# Patient Record
Sex: MALE | Race: WHITE | NOT HISPANIC OR LATINO | Employment: FULL TIME | URBAN - METROPOLITAN AREA
[De-identification: names, ages, dates, MRNs, and addresses within clinical notes are randomized per-mention and may not be internally consistent; named-entity substitution may affect disease eponyms.]

---

## 2017-12-05 ENCOUNTER — TELEPHONE (OUTPATIENT)
Dept: HEMATOLOGY ONCOLOGY | Facility: MEDICAL CENTER | Age: 42
End: 2017-12-05

## 2017-12-05 NOTE — TELEPHONE ENCOUNTER
1st attempt to contact the patient- Left voicemail for patient requesting a return call to schedule New Hematology appointment. Ref: Dr. Valero Dx: pulmonary embolism

## 2017-12-19 ENCOUNTER — OFFICE VISIT (OUTPATIENT)
Dept: HEMATOLOGY ONCOLOGY | Facility: MEDICAL CENTER | Age: 42
End: 2017-12-19

## 2017-12-19 VITALS
RESPIRATION RATE: 16 BRPM | OXYGEN SATURATION: 98 % | TEMPERATURE: 98 F | HEIGHT: 68 IN | HEART RATE: 88 BPM | BODY MASS INDEX: 28.63 KG/M2 | SYSTOLIC BLOOD PRESSURE: 128 MMHG | WEIGHT: 188.93 LBS | DIASTOLIC BLOOD PRESSURE: 74 MMHG

## 2017-12-19 DIAGNOSIS — I26.99 PULMONARY EMBOLUS, LEFT (HCC): ICD-10-CM

## 2017-12-19 PROCEDURE — 99204 OFFICE O/P NEW MOD 45 MIN: CPT | Performed by: INTERNAL MEDICINE

## 2017-12-19 RX ORDER — ASPIRIN 81 MG/1
81 TABLET, CHEWABLE ORAL DAILY
COMMUNITY

## 2017-12-19 ASSESSMENT — PAIN SCALES - GENERAL: PAINLEVEL: 5=MODERATE PAIN

## 2017-12-19 NOTE — PROGRESS NOTES
Consult Note: Hematology    Date of consultation: 12/19/2017     Referring provider: No ref. provider found    Reason for consultation:   CC: Pulmonary embolus    History of presenting illness:   Valeriano Cantu  is a 42 y.o. year old male first seen in clinic on 12/19/2017. He is a very pleasant male who was in his usual state of health until early in November. He developed severe chest pain, shortness of breath which prompted him to go to the hospital on November 12, 2017. Patient had a CT scan of the chest which revealed pulmonary embolism. The pulmonary embolism is unprovoked and the patient was started on Xeralto and discharged home with follow-up with hematology.    Pulmonary embolus  Location, left lung  Severity, small  Modifying factors, tobacco use  Quality, pulmonary artery  Duration, diagnosed in November 2017  Context, discovered on CT scan of the chest done for chest pain  Associated factors, chest pain and shortness of breath    Past Medical History:  Pulmonary embolus    Allergies:  Patient has no known allergies.    Medications:    Current Outpatient Prescriptions   Medication Sig Dispense Refill   • rivaroxaban (XARELTO) 20 MG Tab tablet Take 20 mg by mouth with dinner.     • aspirin (ASA) 81 MG Chew Tab chewable tablet Take 81 mg by mouth every day.     • trazodone (DESYREL) 100 MG TABS Take 100 mg by mouth as needed.       No current facility-administered medications for this visit.        Social History:     Social History     Social History   • Marital status: Single     Spouse name: N/A   • Number of children: N/A   • Years of education: N/A     Occupational History   • Not on file.     Social History Main Topics   • Smoking status: Not on file   • Smokeless tobacco: Not on file   • Alcohol use Not on file   • Drug use: Unknown   • Sexual activity: Not on file     Other Topics Concern   • Not on file     Social History Narrative   • No narrative on file       Family History:     Family History  "  Problem Relation Age of Onset   • Heart Attack Mother    • Cancer Father      h and n       Review of Systems:  Constitutional: No fever, chills, weight loss ,malaise/fatigue.      All other review of systems are negative except what was mentioned above in the HPI.    Physical Exam:  Vitals:   /74   Pulse 88   Temp 36.7 °C (98 °F)   Resp 16   Ht 1.727 m (5' 8\")   Wt 85.7 kg (188 lb 15 oz)   SpO2 98%   BMI 28.73 kg/m²     General: Not in acute distress, alert and oriented x 3  HEENT: No pallor, icterus. Oropharynx clear.   Neck: Supple, no palpable masses.  Lymph nodes: No palpable cervical, supraclavicular, axillary or inguinal lymphadenopathy.    CVS: regular rate and rhythm, no rubs or gallops  RESP: Clear to auscultate bilaterally, no wheezing or crackles.   ABD: Soft, non tender, non distended, positive bowel sounds, no palpable organomegaly  EXT: No edema or cyanosis  CNS: Alert and oriented x3, No focal deficits.  Skin- No rash      Labs:     Imaging        Assessment and Plan:  -VTE  -Patient had a unprovoked symptomatic pulmonary embolus and will need lifelong anticoagulation.  -Discussed the pros and cons of checking for an inherited thrombophilia. There is also which would not . The patient will like to defer workup if it is not affecting the treatment.  -Patient states the Xeralto is too expensive, I suggested Coumadin may be more affordable. The patient will discuss this with his primary care physician.    -Tobacco abuse  -The patient is a 20 year x 1 pack a day smoker.  -discussed its role in VTE.  -Also discussed the patient has early signs of COPD on his CT scan of the chest.  -The patient is not interested in quitting at this time.  -Less than 3 minutes spent in counseling regarding tobacco cessation.                             He agreed and verbalized his agreement and understanding with the current plan.  I answered all questions and concerns he has at this " time.    All labs and/or imaging studies mentioned in the note above were reviewed with and explained to the patient as a pertain to medical decision making.    Please note that this dictation was created using voice recognition software. I have made every reasonable attempt to correct obvious errors, but I expect that there are errors of grammar and possibly content that I did not discover before finalizing the note.      SIGNATURES:  Veronika Avalos    CC:  Pcp Pt States None  No ref. provider found

## 2018-02-21 ENCOUNTER — TELEPHONE (OUTPATIENT)
Dept: HEMATOLOGY ONCOLOGY | Facility: MEDICAL CENTER | Age: 43
End: 2018-02-21

## 2019-11-13 ENCOUNTER — HOSPITAL ENCOUNTER (INPATIENT)
Facility: MEDICAL CENTER | Age: 44
LOS: 4 days | DRG: 194 | End: 2019-11-17
Attending: EMERGENCY MEDICINE | Admitting: INTERNAL MEDICINE
Payer: MEDICAID

## 2019-11-13 ENCOUNTER — HOSPITAL ENCOUNTER (OUTPATIENT)
Dept: RADIOLOGY | Facility: MEDICAL CENTER | Age: 44
End: 2019-11-13

## 2019-11-13 ENCOUNTER — APPOINTMENT (OUTPATIENT)
Dept: RADIOLOGY | Facility: MEDICAL CENTER | Age: 44
DRG: 194 | End: 2019-11-13
Attending: EMERGENCY MEDICINE
Payer: MEDICAID

## 2019-11-13 PROBLEM — E87.6 HYPOKALEMIA: Status: ACTIVE | Noted: 2019-11-13

## 2019-11-13 PROBLEM — R74.8 ELEVATED LIVER ENZYMES: Status: ACTIVE | Noted: 2019-11-13

## 2019-11-13 PROBLEM — Z72.0 NICOTINE ABUSE: Status: ACTIVE | Noted: 2019-11-13

## 2019-11-13 PROBLEM — J18.9 COMMUNITY ACQUIRED PNEUMONIA: Status: ACTIVE | Noted: 2019-11-13

## 2019-11-13 PROBLEM — F10.20 ALCOHOL DEPENDENCE (HCC): Status: ACTIVE | Noted: 2019-11-13

## 2019-11-13 PROBLEM — D61.818 PANCYTOPENIA (HCC): Status: ACTIVE | Noted: 2019-11-13

## 2019-11-13 LAB
ALBUMIN SERPL BCP-MCNC: 2.5 G/DL (ref 3.2–4.9)
ALP SERPL-CCNC: 228 U/L (ref 30–99)
ALT SERPL-CCNC: 95 U/L (ref 2–50)
ANION GAP SERPL CALC-SCNC: 9 MMOL/L (ref 0–11.9)
ANISOCYTOSIS BLD QL SMEAR: ABNORMAL
AST SERPL-CCNC: 165 U/L (ref 12–45)
BASOPHILS # BLD AUTO: 0 % (ref 0–1.8)
BASOPHILS # BLD: 0 K/UL (ref 0–0.12)
BILIRUB CONJ SERPL-MCNC: 0.9 MG/DL (ref 0.1–0.5)
BILIRUB INDIRECT SERPL-MCNC: 0.5 MG/DL (ref 0–1)
BILIRUB SERPL-MCNC: 1.4 MG/DL (ref 0.1–1.5)
BUN SERPL-MCNC: 5 MG/DL (ref 8–22)
CALCIUM SERPL-MCNC: 7.5 MG/DL (ref 8.5–10.5)
CHLORIDE SERPL-SCNC: 102 MMOL/L (ref 96–112)
CO2 SERPL-SCNC: 24 MMOL/L (ref 20–33)
CREAT SERPL-MCNC: 0.46 MG/DL (ref 0.5–1.4)
EKG IMPRESSION: NORMAL
EOSINOPHIL # BLD AUTO: 0 K/UL (ref 0–0.51)
EOSINOPHIL NFR BLD: 0 % (ref 0–6.9)
ERYTHROCYTE [DISTWIDTH] IN BLOOD BY AUTOMATED COUNT: 46.2 FL (ref 35.9–50)
FLUAV RNA SPEC QL NAA+PROBE: NEGATIVE
FLUBV RNA SPEC QL NAA+PROBE: NEGATIVE
GLUCOSE SERPL-MCNC: 117 MG/DL (ref 65–99)
HCT VFR BLD AUTO: 35.3 % (ref 42–52)
HGB BLD-MCNC: 12.7 G/DL (ref 14–18)
LACTATE BLD-SCNC: 1.8 MMOL/L (ref 0.5–2)
LYMPHOCYTES # BLD AUTO: 1.87 K/UL (ref 1–4.8)
LYMPHOCYTES NFR BLD: 42.5 % (ref 22–41)
MANUAL DIFF BLD: NORMAL
MCH RBC QN AUTO: 31.1 PG (ref 27–33)
MCHC RBC AUTO-ENTMCNC: 36 G/DL (ref 33.7–35.3)
MCV RBC AUTO: 86.3 FL (ref 81.4–97.8)
MICROCYTES BLD QL SMEAR: ABNORMAL
MONOCYTES # BLD AUTO: 0.15 K/UL (ref 0–0.85)
MONOCYTES NFR BLD AUTO: 3.5 % (ref 0–13.4)
MORPHOLOGY BLD-IMP: NORMAL
NEUTROPHILS # BLD AUTO: 2.38 K/UL (ref 1.82–7.42)
NEUTROPHILS NFR BLD: 54 % (ref 44–72)
NRBC # BLD AUTO: 0 K/UL
NRBC BLD-RTO: 0 /100 WBC
NT-PROBNP SERPL IA-MCNC: 212 PG/ML (ref 0–125)
PLATELET # BLD AUTO: 78 K/UL (ref 164–446)
PLATELET BLD QL SMEAR: NORMAL
PMV BLD AUTO: 12.8 FL (ref 9–12.9)
POIKILOCYTOSIS BLD QL SMEAR: NORMAL
POLYCHROMASIA BLD QL SMEAR: NORMAL
POTASSIUM SERPL-SCNC: 3.1 MMOL/L (ref 3.6–5.5)
PROCALCITONIN SERPL-MCNC: 0.33 NG/ML
PROT SERPL-MCNC: 6.9 G/DL (ref 6–8.2)
RBC # BLD AUTO: 4.09 M/UL (ref 4.7–6.1)
RBC BLD AUTO: PRESENT
SMUDGE CELLS BLD QL SMEAR: NORMAL
SODIUM SERPL-SCNC: 135 MMOL/L (ref 135–145)
TARGETS BLD QL SMEAR: NORMAL
TROPONIN T SERPL-MCNC: 6 NG/L (ref 6–19)
WBC # BLD AUTO: 4.4 K/UL (ref 4.8–10.8)

## 2019-11-13 PROCEDURE — HZ2ZZZZ DETOXIFICATION SERVICES FOR SUBSTANCE ABUSE TREATMENT: ICD-10-PCS | Performed by: INTERNAL MEDICINE

## 2019-11-13 PROCEDURE — 770020 HCHG ROOM/CARE - TELE (206)

## 2019-11-13 PROCEDURE — 84484 ASSAY OF TROPONIN QUANT: CPT

## 2019-11-13 PROCEDURE — 93005 ELECTROCARDIOGRAM TRACING: CPT | Performed by: EMERGENCY MEDICINE

## 2019-11-13 PROCEDURE — 71275 CT ANGIOGRAPHY CHEST: CPT

## 2019-11-13 PROCEDURE — 85007 BL SMEAR W/DIFF WBC COUNT: CPT

## 2019-11-13 PROCEDURE — 87502 INFLUENZA DNA AMP PROBE: CPT

## 2019-11-13 PROCEDURE — 99407 BEHAV CHNG SMOKING > 10 MIN: CPT | Performed by: INTERNAL MEDICINE

## 2019-11-13 PROCEDURE — 80048 BASIC METABOLIC PNL TOTAL CA: CPT

## 2019-11-13 PROCEDURE — 36415 COLL VENOUS BLD VENIPUNCTURE: CPT

## 2019-11-13 PROCEDURE — 87040 BLOOD CULTURE FOR BACTERIA: CPT

## 2019-11-13 PROCEDURE — 96365 THER/PROPH/DIAG IV INF INIT: CPT

## 2019-11-13 PROCEDURE — 80076 HEPATIC FUNCTION PANEL: CPT

## 2019-11-13 PROCEDURE — 700102 HCHG RX REV CODE 250 W/ 637 OVERRIDE(OP): Performed by: EMERGENCY MEDICINE

## 2019-11-13 PROCEDURE — 83880 ASSAY OF NATRIURETIC PEPTIDE: CPT

## 2019-11-13 PROCEDURE — 84145 PROCALCITONIN (PCT): CPT

## 2019-11-13 PROCEDURE — A9270 NON-COVERED ITEM OR SERVICE: HCPCS | Performed by: EMERGENCY MEDICINE

## 2019-11-13 PROCEDURE — 99285 EMERGENCY DEPT VISIT HI MDM: CPT

## 2019-11-13 PROCEDURE — 85027 COMPLETE CBC AUTOMATED: CPT

## 2019-11-13 PROCEDURE — 700117 HCHG RX CONTRAST REV CODE 255: Performed by: EMERGENCY MEDICINE

## 2019-11-13 PROCEDURE — 83605 ASSAY OF LACTIC ACID: CPT

## 2019-11-13 PROCEDURE — 99223 1ST HOSP IP/OBS HIGH 75: CPT | Performed by: INTERNAL MEDICINE

## 2019-11-13 PROCEDURE — 700105 HCHG RX REV CODE 258: Performed by: EMERGENCY MEDICINE

## 2019-11-13 PROCEDURE — 700111 HCHG RX REV CODE 636 W/ 250 OVERRIDE (IP): Performed by: EMERGENCY MEDICINE

## 2019-11-13 PROCEDURE — 700105 HCHG RX REV CODE 258: Performed by: INTERNAL MEDICINE

## 2019-11-13 RX ORDER — PREDNISONE 20 MG/1
40 TABLET ORAL DAILY
COMMUNITY
Start: 2019-11-05

## 2019-11-13 RX ORDER — LORAZEPAM 0.5 MG/1
0.5 TABLET ORAL EVERY 4 HOURS PRN
Status: DISCONTINUED | OUTPATIENT
Start: 2019-11-13 | End: 2019-11-17 | Stop reason: HOSPADM

## 2019-11-13 RX ORDER — AMOXICILLIN 250 MG
2 CAPSULE ORAL 2 TIMES DAILY
Status: DISCONTINUED | OUTPATIENT
Start: 2019-11-14 | End: 2019-11-14

## 2019-11-13 RX ORDER — TRAZODONE HYDROCHLORIDE 50 MG/1
100 TABLET ORAL
Status: DISCONTINUED | OUTPATIENT
Start: 2019-11-13 | End: 2019-11-17 | Stop reason: HOSPADM

## 2019-11-13 RX ORDER — FOLIC ACID 1 MG/1
1 TABLET ORAL DAILY
Status: DISCONTINUED | OUTPATIENT
Start: 2019-11-14 | End: 2019-11-14

## 2019-11-13 RX ORDER — POLYETHYLENE GLYCOL 3350 17 G/17G
1 POWDER, FOR SOLUTION ORAL
Status: DISCONTINUED | OUTPATIENT
Start: 2019-11-13 | End: 2019-11-14

## 2019-11-13 RX ORDER — LORAZEPAM 2 MG/ML
0.5 INJECTION INTRAMUSCULAR EVERY 4 HOURS PRN
Status: DISCONTINUED | OUTPATIENT
Start: 2019-11-13 | End: 2019-11-17 | Stop reason: HOSPADM

## 2019-11-13 RX ORDER — LORAZEPAM 2 MG/ML
2 INJECTION INTRAMUSCULAR
Status: DISCONTINUED | OUTPATIENT
Start: 2019-11-13 | End: 2019-11-17 | Stop reason: HOSPADM

## 2019-11-13 RX ORDER — LORAZEPAM 2 MG/1
2 TABLET ORAL
Status: DISCONTINUED | OUTPATIENT
Start: 2019-11-13 | End: 2019-11-17 | Stop reason: HOSPADM

## 2019-11-13 RX ORDER — AZITHROMYCIN 250 MG/1
500 TABLET, FILM COATED ORAL DAILY
Status: COMPLETED | OUTPATIENT
Start: 2019-11-14 | End: 2019-11-16

## 2019-11-13 RX ORDER — LORAZEPAM 2 MG/ML
1.5 INJECTION INTRAMUSCULAR
Status: DISCONTINUED | OUTPATIENT
Start: 2019-11-13 | End: 2019-11-17 | Stop reason: HOSPADM

## 2019-11-13 RX ORDER — LORAZEPAM 1 MG/1
1 TABLET ORAL EVERY 4 HOURS PRN
Status: DISCONTINUED | OUTPATIENT
Start: 2019-11-13 | End: 2019-11-17 | Stop reason: HOSPADM

## 2019-11-13 RX ORDER — BENZONATATE 100 MG/1
200 CAPSULE ORAL ONCE
Status: COMPLETED | OUTPATIENT
Start: 2019-11-13 | End: 2019-11-13

## 2019-11-13 RX ORDER — LORAZEPAM 2 MG/1
4 TABLET ORAL
Status: DISCONTINUED | OUTPATIENT
Start: 2019-11-13 | End: 2019-11-17 | Stop reason: HOSPADM

## 2019-11-13 RX ORDER — ONDANSETRON 2 MG/ML
4 INJECTION INTRAMUSCULAR; INTRAVENOUS EVERY 4 HOURS PRN
Status: DISCONTINUED | OUTPATIENT
Start: 2019-11-13 | End: 2019-11-17 | Stop reason: HOSPADM

## 2019-11-13 RX ORDER — LORAZEPAM 2 MG/ML
1 INJECTION INTRAMUSCULAR
Status: DISCONTINUED | OUTPATIENT
Start: 2019-11-13 | End: 2019-11-17 | Stop reason: HOSPADM

## 2019-11-13 RX ORDER — THIAMINE MONONITRATE (VIT B1) 100 MG
100 TABLET ORAL DAILY
Status: DISCONTINUED | OUTPATIENT
Start: 2019-11-14 | End: 2019-11-14

## 2019-11-13 RX ORDER — BISACODYL 10 MG
10 SUPPOSITORY, RECTAL RECTAL
Status: DISCONTINUED | OUTPATIENT
Start: 2019-11-13 | End: 2019-11-14

## 2019-11-13 RX ORDER — SODIUM CHLORIDE 9 MG/ML
INJECTION, SOLUTION INTRAVENOUS CONTINUOUS
Status: DISCONTINUED | OUTPATIENT
Start: 2019-11-13 | End: 2019-11-15

## 2019-11-13 RX ORDER — ASPIRIN 81 MG/1
81 TABLET, CHEWABLE ORAL DAILY
Status: DISCONTINUED | OUTPATIENT
Start: 2019-11-14 | End: 2019-11-17 | Stop reason: HOSPADM

## 2019-11-13 RX ORDER — ONDANSETRON 4 MG/1
4 TABLET, ORALLY DISINTEGRATING ORAL EVERY 4 HOURS PRN
Status: DISCONTINUED | OUTPATIENT
Start: 2019-11-13 | End: 2019-11-17 | Stop reason: HOSPADM

## 2019-11-13 RX ORDER — PROMETHAZINE HYDROCHLORIDE 25 MG/1
12.5-25 TABLET ORAL EVERY 4 HOURS PRN
Status: DISCONTINUED | OUTPATIENT
Start: 2019-11-13 | End: 2019-11-14

## 2019-11-13 RX ORDER — PROMETHAZINE HYDROCHLORIDE 25 MG/1
12.5-25 SUPPOSITORY RECTAL EVERY 4 HOURS PRN
Status: DISCONTINUED | OUTPATIENT
Start: 2019-11-13 | End: 2019-11-14

## 2019-11-13 RX ORDER — PROCHLORPERAZINE EDISYLATE 5 MG/ML
5-10 INJECTION INTRAMUSCULAR; INTRAVENOUS EVERY 4 HOURS PRN
Status: DISCONTINUED | OUTPATIENT
Start: 2019-11-13 | End: 2019-11-14

## 2019-11-13 RX ADMIN — SODIUM CHLORIDE: 9 INJECTION, SOLUTION INTRAVENOUS at 18:50

## 2019-11-13 RX ADMIN — BENZONATATE 200 MG: 100 CAPSULE ORAL at 18:30

## 2019-11-13 RX ADMIN — AMPICILLIN SODIUM AND SULBACTAM SODIUM 3 G: 2; 1 INJECTION, POWDER, FOR SOLUTION INTRAMUSCULAR; INTRAVENOUS at 18:50

## 2019-11-13 RX ADMIN — IOHEXOL 50 ML: 350 INJECTION, SOLUTION INTRAVENOUS at 17:18

## 2019-11-13 SDOH — HEALTH STABILITY: MENTAL HEALTH: HOW OFTEN DO YOU HAVE A DRINK CONTAINING ALCOHOL?: 2-3 TIMES A WEEK

## 2019-11-13 ASSESSMENT — ENCOUNTER SYMPTOMS
VOMITING: 0
INSOMNIA: 0
COUGH: 1
NECK PAIN: 0
SPUTUM PRODUCTION: 0
PALPITATIONS: 0
EYE REDNESS: 0
FEVER: 1
STRIDOR: 0
ORTHOPNEA: 0
BACK PAIN: 0
EYE PAIN: 0
BLURRED VISION: 0
DEPRESSION: 0
EYE DISCHARGE: 0
FOCAL WEAKNESS: 0
ABDOMINAL PAIN: 0
SEIZURES: 0
HEADACHES: 0
CHILLS: 0
MYALGIAS: 0
DIARRHEA: 0
DIZZINESS: 0
SHORTNESS OF BREATH: 1
NAUSEA: 0
WEIGHT LOSS: 0
HEARTBURN: 0
NERVOUS/ANXIOUS: 0

## 2019-11-13 ASSESSMENT — LIFESTYLE VARIABLES
AUDITORY DISTURBANCES: NOT PRESENT
ORIENTATION AND CLOUDING OF SENSORIUM: ORIENTED AND CAN DO SERIAL ADDITIONS
DO YOU DRINK ALCOHOL: YES
TOTAL SCORE: 0
HAVE PEOPLE ANNOYED YOU BY CRITICIZING YOUR DRINKING: YES
ANXIETY: NO ANXIETY (AT EASE)
TREMOR: NO TREMOR
TOTAL SCORE: 1
CONSUMPTION TOTAL: POSITIVE
AGITATION: NORMAL ACTIVITY
EVER FELT BAD OR GUILTY ABOUT YOUR DRINKING: NO
TOTAL SCORE: 1
HAVE YOU EVER FELT YOU SHOULD CUT DOWN ON YOUR DRINKING: NO
NAUSEA AND VOMITING: NO NAUSEA AND NO VOMITING
VISUAL DISTURBANCES: NOT PRESENT
EVER HAD A DRINK FIRST THING IN THE MORNING TO STEADY YOUR NERVES TO GET RID OF A HANGOVER: NO
PAROXYSMAL SWEATS: NO SWEAT VISIBLE
ON A TYPICAL DAY WHEN YOU DRINK ALCOHOL HOW MANY DRINKS DO YOU HAVE: 3
TOTAL SCORE: 1
HEADACHE, FULLNESS IN HEAD: NOT PRESENT
HOW MANY TIMES IN THE PAST YEAR HAVE YOU HAD 5 OR MORE DRINKS IN A DAY: 36
AVERAGE NUMBER OF DAYS PER WEEK YOU HAVE A DRINK CONTAINING ALCOHOL: 3

## 2019-11-13 ASSESSMENT — PAIN DESCRIPTION - DESCRIPTORS: DESCRIPTORS: PRESSURE

## 2019-11-13 NOTE — ED TRIAGE NOTES
Valeriano Cantu  Pt transferred from [facility]. Pt changed into gown and placed on monitor.     Chief Complaint   Patient presents with   • Chest Pressure   • Palpitations   • Cough   • Shortness of Breath     Per report, pt was seen at ED 5 days ago d/t chest pressure and palpitations. Pt was discharged after PE was r/o. Pt returned to ED today c/o increasing pain and SOB. Pt has hx of PE a few years ago and states he was taking xarelto. Pt states he now only takes asa daily. Pt also noted to have dry cough. Pt reports constant chest pain that worsens w/ coughing and deep inspiration described as tightness and pressure. Pt denies any recent travel or recent abx use. Pt currently resting comfortable on gurney, O2 sat at 96% on RA. VSS.   Chart up and ready for ERP now.

## 2019-11-14 PROBLEM — K70.10 ALCOHOLIC HEPATITIS WITHOUT ASCITES: Status: ACTIVE | Noted: 2019-11-13

## 2019-11-14 LAB
ALBUMIN SERPL BCP-MCNC: 2.3 G/DL (ref 3.2–4.9)
ALBUMIN/GLOB SERPL: 0.6 G/DL
ALP SERPL-CCNC: 230 U/L (ref 30–99)
ALT SERPL-CCNC: 108 U/L (ref 2–50)
ANION GAP SERPL CALC-SCNC: 5 MMOL/L (ref 0–11.9)
AST SERPL-CCNC: 280 U/L (ref 12–45)
BILIRUB SERPL-MCNC: 1.9 MG/DL (ref 0.1–1.5)
BUN SERPL-MCNC: 5 MG/DL (ref 8–22)
CALCIUM SERPL-MCNC: 7.6 MG/DL (ref 8.5–10.5)
CHLORIDE SERPL-SCNC: 101 MMOL/L (ref 96–112)
CO2 SERPL-SCNC: 24 MMOL/L (ref 20–33)
CREAT SERPL-MCNC: 0.47 MG/DL (ref 0.5–1.4)
ERYTHROCYTE [DISTWIDTH] IN BLOOD BY AUTOMATED COUNT: 47.7 FL (ref 35.9–50)
GLOBULIN SER CALC-MCNC: 3.8 G/DL (ref 1.9–3.5)
GLUCOSE SERPL-MCNC: 100 MG/DL (ref 65–99)
HCT VFR BLD AUTO: 33 % (ref 42–52)
HGB BLD-MCNC: 11.5 G/DL (ref 14–18)
MAGNESIUM SERPL-MCNC: 1.8 MG/DL (ref 1.5–2.5)
MCH RBC QN AUTO: 30.3 PG (ref 27–33)
MCHC RBC AUTO-ENTMCNC: 34.8 G/DL (ref 33.7–35.3)
MCV RBC AUTO: 86.8 FL (ref 81.4–97.8)
PHOSPHATE SERPL-MCNC: 1.9 MG/DL (ref 2.5–4.5)
PLATELET # BLD AUTO: 80 K/UL (ref 164–446)
PMV BLD AUTO: 12.8 FL (ref 9–12.9)
POTASSIUM SERPL-SCNC: 3.1 MMOL/L (ref 3.6–5.5)
PROT SERPL-MCNC: 6.1 G/DL (ref 6–8.2)
RBC # BLD AUTO: 3.8 M/UL (ref 4.7–6.1)
SODIUM SERPL-SCNC: 130 MMOL/L (ref 135–145)
WBC # BLD AUTO: 5.8 K/UL (ref 4.8–10.8)

## 2019-11-14 PROCEDURE — 94760 N-INVAS EAR/PLS OXIMETRY 1: CPT

## 2019-11-14 PROCEDURE — 770020 HCHG ROOM/CARE - TELE (206)

## 2019-11-14 PROCEDURE — 700111 HCHG RX REV CODE 636 W/ 250 OVERRIDE (IP): Performed by: INTERNAL MEDICINE

## 2019-11-14 PROCEDURE — 700102 HCHG RX REV CODE 250 W/ 637 OVERRIDE(OP): Performed by: INTERNAL MEDICINE

## 2019-11-14 PROCEDURE — 700105 HCHG RX REV CODE 258: Performed by: INTERNAL MEDICINE

## 2019-11-14 PROCEDURE — 96366 THER/PROPH/DIAG IV INF ADDON: CPT

## 2019-11-14 PROCEDURE — A9270 NON-COVERED ITEM OR SERVICE: HCPCS | Performed by: INTERNAL MEDICINE

## 2019-11-14 PROCEDURE — 84100 ASSAY OF PHOSPHORUS: CPT

## 2019-11-14 PROCEDURE — 83735 ASSAY OF MAGNESIUM: CPT

## 2019-11-14 PROCEDURE — 99233 SBSQ HOSP IP/OBS HIGH 50: CPT | Performed by: INTERNAL MEDICINE

## 2019-11-14 PROCEDURE — 80053 COMPREHEN METABOLIC PANEL: CPT

## 2019-11-14 PROCEDURE — 36415 COLL VENOUS BLD VENIPUNCTURE: CPT

## 2019-11-14 PROCEDURE — 85027 COMPLETE CBC AUTOMATED: CPT

## 2019-11-14 RX ORDER — THIAMINE MONONITRATE (VIT B1) 100 MG
100 TABLET ORAL DAILY
Status: DISCONTINUED | OUTPATIENT
Start: 2019-11-15 | End: 2019-11-17 | Stop reason: HOSPADM

## 2019-11-14 RX ORDER — MAGNESIUM SULFATE HEPTAHYDRATE 40 MG/ML
2 INJECTION, SOLUTION INTRAVENOUS ONCE
Status: COMPLETED | OUTPATIENT
Start: 2019-11-14 | End: 2019-11-14

## 2019-11-14 RX ORDER — POTASSIUM CHLORIDE 20 MEQ/1
40 TABLET, EXTENDED RELEASE ORAL EVERY 4 HOURS
Status: COMPLETED | OUTPATIENT
Start: 2019-11-14 | End: 2019-11-14

## 2019-11-14 RX ORDER — LOPERAMIDE HYDROCHLORIDE 2 MG/1
4 CAPSULE ORAL ONCE
Status: COMPLETED | OUTPATIENT
Start: 2019-11-14 | End: 2019-11-14

## 2019-11-14 RX ORDER — NICOTINE 21 MG/24HR
14 PATCH, TRANSDERMAL 24 HOURS TRANSDERMAL
Status: DISCONTINUED | OUTPATIENT
Start: 2019-11-14 | End: 2019-11-17 | Stop reason: HOSPADM

## 2019-11-14 RX ORDER — FOLIC ACID 1 MG/1
1 TABLET ORAL DAILY
Status: DISCONTINUED | OUTPATIENT
Start: 2019-11-15 | End: 2019-11-17 | Stop reason: HOSPADM

## 2019-11-14 RX ORDER — SODIUM CHLORIDE 9 MG/ML
INJECTION, SOLUTION INTRAVENOUS
Status: DISCONTINUED
Start: 2019-11-14 | End: 2019-11-14

## 2019-11-14 RX ADMIN — AMPICILLIN SODIUM AND SULBACTAM SODIUM 3 G: 2; 1 INJECTION, POWDER, FOR SOLUTION INTRAMUSCULAR; INTRAVENOUS at 02:42

## 2019-11-14 RX ADMIN — THERA TABS 1 TABLET: TAB at 04:15

## 2019-11-14 RX ADMIN — MAGNESIUM SULFATE IN WATER 2 G: 40 INJECTION, SOLUTION INTRAVENOUS at 14:17

## 2019-11-14 RX ADMIN — LOPERAMIDE HYDROCHLORIDE 4 MG: 2 CAPSULE ORAL at 21:34

## 2019-11-14 RX ADMIN — NICOTINE 14 MG: 14 PATCH TRANSDERMAL at 14:16

## 2019-11-14 RX ADMIN — POTASSIUM CHLORIDE 40 MEQ: 1500 TABLET, EXTENDED RELEASE ORAL at 14:17

## 2019-11-14 RX ADMIN — SODIUM CHLORIDE: 9 INJECTION, SOLUTION INTRAVENOUS at 08:06

## 2019-11-14 RX ADMIN — FOLIC ACID 1 MG: 1 TABLET ORAL at 04:16

## 2019-11-14 RX ADMIN — POTASSIUM CHLORIDE 40 MEQ: 1500 TABLET, EXTENDED RELEASE ORAL at 16:46

## 2019-11-14 RX ADMIN — AMPICILLIN SODIUM AND SULBACTAM SODIUM 3 G: 2; 1 INJECTION, POWDER, FOR SOLUTION INTRAMUSCULAR; INTRAVENOUS at 16:46

## 2019-11-14 RX ADMIN — ASPIRIN 81 MG 81 MG: 81 TABLET ORAL at 04:16

## 2019-11-14 RX ADMIN — AMPICILLIN SODIUM AND SULBACTAM SODIUM 3 G: 2; 1 INJECTION, POWDER, FOR SOLUTION INTRAMUSCULAR; INTRAVENOUS at 11:44

## 2019-11-14 RX ADMIN — SODIUM CHLORIDE: 9 INJECTION, SOLUTION INTRAVENOUS at 14:22

## 2019-11-14 RX ADMIN — AZITHROMYCIN 500 MG: 250 TABLET, FILM COATED ORAL at 04:15

## 2019-11-14 RX ADMIN — Medication 100 MG: at 04:15

## 2019-11-14 RX ADMIN — ENOXAPARIN SODIUM 40 MG: 100 INJECTION SUBCUTANEOUS at 04:16

## 2019-11-14 RX ADMIN — AMPICILLIN SODIUM AND SULBACTAM SODIUM 3 G: 2; 1 INJECTION, POWDER, FOR SOLUTION INTRAMUSCULAR; INTRAVENOUS at 23:47

## 2019-11-14 RX ADMIN — AMPICILLIN SODIUM AND SULBACTAM SODIUM 3 G: 2; 1 INJECTION, POWDER, FOR SOLUTION INTRAMUSCULAR; INTRAVENOUS at 08:04

## 2019-11-14 RX ADMIN — DIBASIC SODIUM PHOSPHATE, MONOBASIC POTASSIUM PHOSPHATE AND MONOBASIC SODIUM PHOSPHATE 2 TABLET: 852; 155; 130 TABLET ORAL at 16:46

## 2019-11-14 RX ADMIN — DIBASIC SODIUM PHOSPHATE, MONOBASIC POTASSIUM PHOSPHATE AND MONOBASIC SODIUM PHOSPHATE 2 TABLET: 852; 155; 130 TABLET ORAL at 14:17

## 2019-11-14 ASSESSMENT — ENCOUNTER SYMPTOMS
CONSTIPATION: 0
FLANK PAIN: 0
DIARRHEA: 0
DIZZINESS: 0
COUGH: 1
PND: 0
HEARTBURN: 0
VOMITING: 0
WEIGHT LOSS: 0
FEVER: 0
BACK PAIN: 0
NAUSEA: 0
DIAPHORESIS: 0
HEMOPTYSIS: 0
BLOOD IN STOOL: 0
SHORTNESS OF BREATH: 1
FALLS: 0
DOUBLE VISION: 0
ABDOMINAL PAIN: 0
MYALGIAS: 0
SPUTUM PRODUCTION: 1
CHILLS: 0
WHEEZING: 0
BLURRED VISION: 0
PALPITATIONS: 0
HEADACHES: 0
NECK PAIN: 0
DEPRESSION: 0

## 2019-11-14 ASSESSMENT — LIFESTYLE VARIABLES
VISUAL DISTURBANCES: NOT PRESENT
AUDITORY DISTURBANCES: NOT PRESENT
ORIENTATION AND CLOUDING OF SENSORIUM: ORIENTED AND CAN DO SERIAL ADDITIONS
AUDITORY DISTURBANCES: NOT PRESENT
HEADACHE, FULLNESS IN HEAD: NOT PRESENT
HEADACHE, FULLNESS IN HEAD: NOT PRESENT
PAROXYSMAL SWEATS: NO SWEAT VISIBLE
ANXIETY: NO ANXIETY (AT EASE)
HEADACHE, FULLNESS IN HEAD: NOT PRESENT
AGITATION: NORMAL ACTIVITY
AGITATION: NORMAL ACTIVITY
PAROXYSMAL SWEATS: NO SWEAT VISIBLE
ANXIETY: NO ANXIETY (AT EASE)
AUDITORY DISTURBANCES: NOT PRESENT
AGITATION: NORMAL ACTIVITY
TREMOR: NO TREMOR
TOTAL SCORE: 0
TOTAL SCORE: 0
PAROXYSMAL SWEATS: NO SWEAT VISIBLE
NAUSEA AND VOMITING: NO NAUSEA AND NO VOMITING
ORIENTATION AND CLOUDING OF SENSORIUM: ORIENTED AND CAN DO SERIAL ADDITIONS
ORIENTATION AND CLOUDING OF SENSORIUM: ORIENTED AND CAN DO SERIAL ADDITIONS
AGITATION: NORMAL ACTIVITY
TREMOR: NO TREMOR
TREMOR: NO TREMOR
VISUAL DISTURBANCES: NOT PRESENT
EVER_SMOKED: YES
VISUAL DISTURBANCES: NOT PRESENT
TOTAL SCORE: 0
ORIENTATION AND CLOUDING OF SENSORIUM: ORIENTED AND CAN DO SERIAL ADDITIONS
AUDITORY DISTURBANCES: NOT PRESENT
NAUSEA AND VOMITING: NO NAUSEA AND NO VOMITING
VISUAL DISTURBANCES: NOT PRESENT
PAROXYSMAL SWEATS: NO SWEAT VISIBLE
ANXIETY: NO ANXIETY (AT EASE)
EVER_SMOKED: YES
TOTAL SCORE: 0
HEADACHE, FULLNESS IN HEAD: NOT PRESENT
EVER_SMOKED: YES
ANXIETY: NO ANXIETY (AT EASE)
NAUSEA AND VOMITING: NO NAUSEA AND NO VOMITING
TREMOR: NO TREMOR
NAUSEA AND VOMITING: NO NAUSEA AND NO VOMITING

## 2019-11-14 ASSESSMENT — COPD QUESTIONNAIRES
DO YOU EVER COUGH UP ANY MUCUS OR PHLEGM?: NO/ONLY WITH OCCASIONAL COLDS OR INFECTIONS
COPD SCREENING SCORE: 2
DURING THE PAST 4 WEEKS HOW MUCH DID YOU FEEL SHORT OF BREATH: NONE/LITTLE OF THE TIME
DURING THE PAST 4 WEEKS HOW MUCH DID YOU FEEL SHORT OF BREATH: NONE/LITTLE OF THE TIME
COPD SCREENING SCORE: 2
HAVE YOU SMOKED AT LEAST 100 CIGARETTES IN YOUR ENTIRE LIFE: YES
DO YOU EVER COUGH UP ANY MUCUS OR PHLEGM?: NO/ONLY WITH OCCASIONAL COLDS OR INFECTIONS
HAVE YOU SMOKED AT LEAST 100 CIGARETTES IN YOUR ENTIRE LIFE: YES

## 2019-11-14 ASSESSMENT — PATIENT HEALTH QUESTIONNAIRE - PHQ9
1. LITTLE INTEREST OR PLEASURE IN DOING THINGS: NOT AT ALL
2. FEELING DOWN, DEPRESSED, IRRITABLE, OR HOPELESS: NOT AT ALL
SUM OF ALL RESPONSES TO PHQ9 QUESTIONS 1 AND 2: 0
SUM OF ALL RESPONSES TO PHQ9 QUESTIONS 1 AND 2: 0
1. LITTLE INTEREST OR PLEASURE IN DOING THINGS: NOT AT ALL
2. FEELING DOWN, DEPRESSED, IRRITABLE, OR HOPELESS: NOT AT ALL

## 2019-11-14 ASSESSMENT — COGNITIVE AND FUNCTIONAL STATUS - GENERAL
DAILY ACTIVITIY SCORE: 24
SUGGESTED CMS G CODE MODIFIER DAILY ACTIVITY: CH
MOBILITY SCORE: 24
SUGGESTED CMS G CODE MODIFIER MOBILITY: CH

## 2019-11-14 NOTE — ASSESSMENT & PLAN NOTE
Monitor LFTs and INR  Interval CMP, INR in the morning tomorrow  Counseled and educated regarding alcohol cessation  Optimize nutrition  Improving

## 2019-11-14 NOTE — ED PROVIDER NOTES
"ED Provider Note    Scribed for John Quigley M.D. by John Quigley. 11/13/2019,  4:20 PM.    CHIEF COMPLAINT  Chief Complaint   Patient presents with   • Chest Pressure   • Palpitations   • Cough   • Shortness of Breath       HPI  Valeriano Cantu is a 44 y.o. male who presents to the Emergency Department as a transfer from [facility], [place], operated by [facility], out of concern for chest pain, hypokalemia, and appearing diaphoretic, shortness of breath, and initially tachycardic.  He was reportedly admitted there, and discharged home on the fifth on a course of steroids and an inhaler after PE/PNA was ruled out. He has a history of pulmonary embolism.  He says that about a year ago, he stopped taking his Xarelto because it was too expensive.  He has been on aspirin since then. Pt returned to same ED today c/o increasing pain and SOB.  He reports difficulty getting out of bed for a few days because of generalized weakness and shortness of breath.  He reports a nonproductive cough, and describes his tight constant chest pain is getting worse with coughs or with deep inspiration.  He has no prior diagnosis of COPD or any lung disease requiring supplemental oxygen.  He continues to be about a half a pack a day smoker.  He has not had fevers or chills or nausea or vomiting.      His records show a CT pulmonary angiogram from November 5.  There was no evidence of PE.  There was mild right hilar adenopathy.  There were also subtle areas of parenchymal density in the right upper lobe and left lower lobe.  Both the hilar adenopathy and parenchymal densities were different from previous, and thought to represent inflammation, infection, neoplasm, or old scarring related to COPD.  He was discharged on steroids and albuterol from his ED visit on the fifth.  The patient is no longer on anticoagulation for his previous PE.  He is on aspirin.  He represents that same hospital day, reporting a chief complaint of \"I am having a hard " "time breathing.\"  Symptoms have been present since he was seen in the emergency department.  The patient is a smoker.  His lab tests today showed low sodium at 127, very low potassium at 2.7, low chloride at 90, alk phos of 314, AST at 290, , elevated bilirubin at 2.3.  His troponin was negative.  His TSH was high at 3.765, but his T4 was normal at 1.54.  His CBC and differential did not show evidence of bacterial infection.  His heart rate was as high as 125.    REVIEW OF SYSTEMS  See HPI for further details. All other systems are negative.     PAST MEDICAL HISTORY   has a past medical history of History of pulmonary embolus (PE).    SOCIAL HISTORY  Social History     Tobacco Use   • Smoking status: Current Every Day Smoker     Packs/day: 0.50     Types: Cigarettes   • Smokeless tobacco: Never Used   Substance and Sexual Activity   • Alcohol use: Yes     Frequency: 2-3 times a week   • Drug use: Not Currently   • Sexual activity: Not on file     Social History     Substance and Sexual Activity   Drug Use Not Currently       SURGICAL HISTORY  patient denies any surgical history    CURRENT MEDICATIONS  Home Medications     Reviewed by Adela Winter (Pharmacy Tech) on 11/13/19 at 1644  Med List Status: Complete   Medication Last Dose Status   aspirin (ASA) 81 MG Chew Tab chewable tablet 11/13/2019 Active   predniSONE (DELTASONE) 20 MG Tab 11/13/2019 Active   trazodone (DESYREL) 100 MG TABS 11/11/2019 Active                ALLERGIES  No Known Allergies    PHYSICAL EXAM  VITAL SIGNS: /61   Pulse 89   Temp (!) 35.6 °C (96 °F) (Oral)   Resp 20   Ht 1.727 m (5' 8\")   Wt 75.3 kg (166 lb)   SpO2 95%   BMI 25.24 kg/m²   Pulse ox interpretation: I interpret this pulse ox as normal.  Constitutional: Alert in no apparent distress.  HENT: No signs of trauma, Bilateral external ears normal, Nose normal.   Eyes: Conjunctiva normal, Non-icteric.   Neck: Normal range of motion, Supple, No stridor. "   Lymphatic: No lymphadenopathy noted.   Cardiovascular: Regular rate and rhythm, no murmurs.   Thorax & Lungs: Normal breath sounds, No respiratory distress, No wheezing, No chest tenderness.   Abdomen: Bowel sounds normal, Soft, No tenderness, No masses, No pulsatile masses. No peritoneal signs.  Skin: Warm, Dry, No erythema, No rash.   Back: No midline bony tenderness.   Extremities: Intact distal pulses, No edema, No cyanosis.  Musculoskeletal: Good range of motion in all major joints. No or major deformities noted.   Neurologic: Alert , Normal motor function, Normal sensory function, No focal deficits noted.   Psychiatric: Affect normal, Judgment normal, Mood normal.     DIAGNOSTIC STUDIES / PROCEDURES    LABS  Labs Reviewed   CBC WITH DIFFERENTIAL - Abnormal; Notable for the following components:       Result Value    WBC 4.4 (*)     RBC 4.09 (*)     Hemoglobin 12.7 (*)     Hematocrit 35.3 (*)     MCHC 36.0 (*)     Platelet Count 78 (*)     Lymphocytes 42.50 (*)     All other components within normal limits   BASIC METABOLIC PANEL - Abnormal; Notable for the following components:    Potassium 3.1 (*)     Glucose 117 (*)     Bun 5 (*)     Creatinine 0.46 (*)     Calcium 7.5 (*)     All other components within normal limits   HEPATIC FUNCTION PANEL - Abnormal; Notable for the following components:    Alkaline Phosphatase 228 (*)     AST(SGOT) 165 (*)     ALT(SGPT) 95 (*)     Direct Bilirubin 0.9 (*)     Albumin 2.5 (*)     All other components within normal limits   PROBRAIN NATRIURETIC PEPTIDE, NT - Abnormal; Notable for the following components:    NT-proBNP 212 (*)     All other components within normal limits   PROCALCITONIN - Abnormal; Notable for the following components:    Procalcitonin 0.33 (*)     All other components within normal limits   TROPONIN   ESTIMATED GFR   DIFFERENTIAL MANUAL   PERIPHERAL SMEAR REVIEW   PLATELET ESTIMATE   MORPHOLOGY   LACTIC ACID   BLOOD CULTURE    Narrative:     Per  "Hospital Policy: Only change Specimen Src: to \"Line\" if  specified by physician order.   BLOOD CULTURE    Narrative:     Per Hospital Policy: Only change Specimen Src: to \"Line\" if  specified by physician order.   INFLUENZA A/B BY PCR   CULTURE RESPIRATORY W/ GRM STN   CBC WITHOUT DIFFERENTIAL   COMP METABOLIC PANEL     All labs reviewed by me.    RADIOLOGY  CT-CTA CHEST PULMONARY ARTERY W/ RECONS   Final Result      1.  No pulmonary embolus.   2.  Evolving multifocal pneumonia.   3.  Emphysema.   4.  Mild mediastinal lymphadenopathy, which is likely reactive and related to pneumonia. Consider follow-up chest CT in 2-3 months upon pneumonia resolution.   5.  Hepatic steatosis.   6.  Splenomegaly.            OUTSIDE IMAGES-DX CHEST   Final Result      OUTSIDE IMAGES-CT CHEST   Final Result        The radiologist's interpretation of all radiological studies have been reviewed by me.    EKG:   Interpreted by me    Rhythm:  Normal sinus rhythm   Rate: 70  SINUS RHYTHM  INCOMPLETE RIGHT BUNDLE BRANCH BLOCK  BORDERLINE PROLONGED QT INTERVAL  No previous ECG available for comparison    COURSE & MEDICAL DECISION MAKING  Nursing notes, VS, PMSFHx reviewed in chart.     4:20 PM Patient seen and examined at bedside. Differential diagnosis includes but is not limited to pulmonary embolism, neoplastic process in the chest, acute coronary syndrome, congestive heart failure. Ordered for CT pulmonary angiogram, and screening laboratory tests to evaluate.     6:01 PM once again, this patient's chest CT rules out pulmonary embolism.  Instead, it demonstrates an evolving multifocal pneumonia.  This will be treated with Unasyn, per protocol for simple community-acquired pneumonia or aspiration PNA.  Aspiration pneumonia certainly consideration for this patient, whose labs showed no evidence of alcohol abuse.  I have also paged the hospitalist for admission.    6:13 PM Dr. Gustafson agrees to admit.    DISPOSITION:  Patient will be admitted " to Dr. Gustafson in stable condition.      FINAL IMPRESSION  1. Pneumonia

## 2019-11-14 NOTE — ASSESSMENT & PLAN NOTE
Suspect related to chronic alcoholism  Alcohol cessation reinforced  Recommend outpatient PCP follow-up, monitoring and evaluation by PCP if persistent, including referral to hematology

## 2019-11-14 NOTE — ASSESSMENT & PLAN NOTE
Patient has underlying multifocal community-acquired pneumonia  Completed azithromycin  De escalated to PO Augmentin - stop dates in place   Findings of mediastinal lymphadenopathy, patient will require interval chest x-ray in 2 weeks with PCP and 4 weeks CT chest with PCP following discharge  Continue close clinical monitoring

## 2019-11-14 NOTE — PROGRESS NOTES
2 RN skin check complete   Devices in place Tele leads  Skin assessed under device yes  Confirmed wounds found None  New potential wounds noted on None  Wound consult placed N/A  The following interventions in place Patient instructed to frequently reposition self in bed, pillows in place for support.    Patient has multiple healing scabs on right elbow and left arm. No open areas noted.

## 2019-11-14 NOTE — ASSESSMENT & PLAN NOTE
Last drink, last Sunday  ETOH withdrawal improved   Recommend ongoing monitoring   On CIWA protocol, supplementation of benzodiazepines as clinically appropriate per [facility] protocol  Continue gabapentin  VA - monitor LFTs - if worsening stop this   Counseled and educated regarding cessation respectively  Continue close clinical monitoring   If worsening transfer ICU for phenobarbital protocol

## 2019-11-14 NOTE — H&P
Hospital Medicine History & Physical Note    Date of Service  11/13/2019    Primary Care Physician  Pcp Pt States None    Consultants  none    Code Status  full    Chief Complaint  SOB, cough    History of Presenting Illness  44 y.o. male with no significant past medical history who presented 11/13/2019 with shortness of breath.  The patient was transferred from outside facility for evaluation of possible heart failure.  The patient stated that he has been having short of breath for the past 10 days and getting worse lately.  And he has been coughing for the past 2 weeks associated with fever.  In the ER he had a CT scan of the chest done and found to have multi local pneumonia.  Patient was started on IV antibiotic in ER.  In addition the patient stated that he drinks 2-3 times a week with each time a few beer and a shot of hard liquor.  His last drink was this past Sunday.  He will be admitted to the telemetry floor for further evaluation and management.    Review of Systems  Review of Systems   Constitutional: Positive for fever. Negative for chills and weight loss.   HENT: Negative for congestion and nosebleeds.    Eyes: Negative for blurred vision, pain, discharge and redness.   Respiratory: Positive for cough and shortness of breath. Negative for sputum production and stridor.    Cardiovascular: Negative for chest pain, palpitations and orthopnea.   Gastrointestinal: Negative for abdominal pain, diarrhea, heartburn, nausea and vomiting.   Genitourinary: Negative for dysuria, frequency and urgency.   Musculoskeletal: Negative for back pain, myalgias and neck pain.   Skin: Negative for itching and rash.   Neurological: Negative for dizziness, focal weakness, seizures and headaches.   Psychiatric/Behavioral: Negative for depression. The patient is not nervous/anxious and does not have insomnia.        Past Medical History   has a past medical history of History of pulmonary embolus (PE).    Surgical  History  Reviewed and no pertinent past surgical history    Family History  family history includes Cancer in his father; Heart Attack in his mother.     Social History   reports that he has been smoking cigarettes. He has been smoking about 0.50 packs per day. He has never used smokeless tobacco. He reports current alcohol use. He reports previous drug use.    Allergies  No Known Allergies    Medications  Prior to Admission Medications   Prescriptions Last Dose Informant Patient Reported? Taking?   aspirin (ASA) 81 MG Chew Tab chewable tablet 11/13/2019 at am Patient Yes No   Sig: Take 81 mg by mouth every day.   predniSONE (DELTASONE) 20 MG Tab 11/13/2019 at am Rx Bottle (For Med Information) Yes Yes   Sig: Take 40 mg by mouth every day.   trazodone (DESYREL) 100 MG TABS 11/11/2019 at hs  Patient Yes No   Sig: Take 100 mg by mouth at bedtime as needed for Sleep.      Facility-Administered Medications: None       Physical Exam  Temp:  [35.6 °C (96 °F)] 35.6 °C (96 °F)  Pulse:  [83-89] 83  Resp:  [20] 20  BP: (108-123)/(70-77) 108/70  SpO2:  [96 %] 96 %    Physical Exam  Vitals signs reviewed.   Constitutional:       General: He is not in acute distress.  HENT:      Head: Normocephalic and atraumatic.      Nose: No congestion or rhinorrhea.   Eyes:      Extraocular Movements: Extraocular movements intact.      Pupils: Pupils are equal, round, and reactive to light.   Neck:      Musculoskeletal: Normal range of motion and neck supple.   Cardiovascular:      Rate and Rhythm: Normal rate and regular rhythm.      Pulses: Normal pulses.   Pulmonary:      Effort: Pulmonary effort is normal. No respiratory distress.      Breath sounds: Normal breath sounds.   Abdominal:      General: Bowel sounds are normal. There is no distension.      Palpations: Abdomen is soft.      Tenderness: There is no tenderness.   Musculoskeletal:         General: No swelling or tenderness.   Skin:     General: Skin is warm.      Findings: No  erythema.   Neurological:      General: No focal deficit present.      Mental Status: He is alert and oriented to person, place, and time.         Laboratory:  Recent Labs     11/13/19  1640   WBC 4.4*   RBC 4.09*   HEMOGLOBIN 12.7*   HEMATOCRIT 35.3*   MCV 86.3   MCH 31.1   MCHC 36.0*   RDW 46.2   PLATELETCT 78*   MPV 12.8     Recent Labs     11/13/19  1640   SODIUM 135   POTASSIUM 3.1*   CHLORIDE 102   CO2 24   GLUCOSE 117*   BUN 5*   CREATININE 0.46*   CALCIUM 7.5*     Recent Labs     11/13/19  1640   ALTSGPT 95*   ASTSGOT 165*   ALKPHOSPHAT 228*   TBILIRUBIN 1.4   DBILIRUBIN 0.9*   GLUCOSE 117*         Recent Labs     11/13/19  1640   NTPROBNP 212*         Recent Labs     11/13/19  1640   TROPONINT 6       Urinalysis:    No results found     Imaging:  CT-CTA CHEST PULMONARY ARTERY W/ RECONS   Final Result      1.  No pulmonary embolus.   2.  Evolving multifocal pneumonia.   3.  Emphysema.   4.  Mild mediastinal lymphadenopathy, which is likely reactive and related to pneumonia. Consider follow-up chest CT in 2-3 months upon pneumonia resolution.   5.  Hepatic steatosis.   6.  Splenomegaly.            OUTSIDE IMAGES-DX CHEST   Final Result      OUTSIDE IMAGES-CT CHEST   Final Result            Assessment/Plan:  I anticipate this patient will require at least two midnights for appropriate medical management, necessitating inpatient admission.    Community acquired pneumonia- (present on admission)  Assessment & Plan  Patient was started on IV Unasyn and azithromycin  Follow cultures    Alcohol dependence (HCC)- (present on admission)  Assessment & Plan  Last drink was this past Sunday  No signs of withdrawal symptoms  Vitamin supplements    Hypokalemia- (present on admission)  Assessment & Plan  Replete as needed      Nicotine abuse- (present on admission)  Assessment & Plan  Tobacco cessation education provided for more than 11 minutes, discussed options of nicotine patch, medical treatment with wellbutrin and  chantix. Discussed the risks of smoking including increased risk of heart disease, stroke, cancer and COPD. Discussed the benefits of quitting smoking. Nicotine replacement protocol will be provided to the patient.    Elevated liver enzymes- (present on admission)  Assessment & Plan  Related to chronic alcoholic  Follow CMP in the morning  Cessation education was given    Pancytopenia (HCC)- (present on admission)  Assessment & Plan  Likely related to chronic alcoholic  Follow CBC in the morning      VTE prophylaxis: lovenox

## 2019-11-14 NOTE — ED NOTES
Medication reconciliation updated and complete per pt at bedside  Allergies have been verified  No oral ABX within the last 14 days  Pt home [facility]

## 2019-11-15 ENCOUNTER — APPOINTMENT (OUTPATIENT)
Dept: RADIOLOGY | Facility: MEDICAL CENTER | Age: 44
DRG: 194 | End: 2019-11-15
Attending: INTERNAL MEDICINE
Payer: MEDICAID

## 2019-11-15 ENCOUNTER — PATIENT OUTREACH (OUTPATIENT)
Dept: HEALTH INFORMATION MANAGEMENT | Facility: OTHER | Age: 44
End: 2019-11-15

## 2019-11-15 LAB
ALBUMIN SERPL BCP-MCNC: 2.2 G/DL (ref 3.2–4.9)
ALBUMIN/GLOB SERPL: 0.6 G/DL
ALP SERPL-CCNC: 276 U/L (ref 30–99)
ALT SERPL-CCNC: 122 U/L (ref 2–50)
ANION GAP SERPL CALC-SCNC: 5 MMOL/L (ref 0–11.9)
AST SERPL-CCNC: 240 U/L (ref 12–45)
BILIRUB SERPL-MCNC: 1.5 MG/DL (ref 0.1–1.5)
BUN SERPL-MCNC: 4 MG/DL (ref 8–22)
CALCIUM SERPL-MCNC: 7.4 MG/DL (ref 8.5–10.5)
CHLORIDE SERPL-SCNC: 106 MMOL/L (ref 96–112)
CO2 SERPL-SCNC: 22 MMOL/L (ref 20–33)
CREAT SERPL-MCNC: 0.39 MG/DL (ref 0.5–1.4)
ERYTHROCYTE [DISTWIDTH] IN BLOOD BY AUTOMATED COUNT: 48.2 FL (ref 35.9–50)
FOLATE SERPL-MCNC: 3.7 NG/ML
GLOBULIN SER CALC-MCNC: 3.8 G/DL (ref 1.9–3.5)
GLUCOSE SERPL-MCNC: 91 MG/DL (ref 65–99)
HCT VFR BLD AUTO: 34 % (ref 42–52)
HGB BLD-MCNC: 11.9 G/DL (ref 14–18)
INR PPP: 0.98 (ref 0.87–1.13)
MAGNESIUM SERPL-MCNC: 1.8 MG/DL (ref 1.5–2.5)
MCH RBC QN AUTO: 30.4 PG (ref 27–33)
MCHC RBC AUTO-ENTMCNC: 35 G/DL (ref 33.7–35.3)
MCV RBC AUTO: 86.7 FL (ref 81.4–97.8)
PHOSPHATE SERPL-MCNC: 2.1 MG/DL (ref 2.5–4.5)
PLATELET # BLD AUTO: 80 K/UL (ref 164–446)
PMV BLD AUTO: 11.5 FL (ref 9–12.9)
POTASSIUM SERPL-SCNC: 3.2 MMOL/L (ref 3.6–5.5)
PROCALCITONIN SERPL-MCNC: 0.24 NG/ML
PROT SERPL-MCNC: 6 G/DL (ref 6–8.2)
PROTHROMBIN TIME: 13.2 SEC (ref 12–14.6)
RBC # BLD AUTO: 3.92 M/UL (ref 4.7–6.1)
SODIUM SERPL-SCNC: 133 MMOL/L (ref 135–145)
TSH SERPL DL<=0.005 MIU/L-ACNC: 3.97 UIU/ML (ref 0.38–5.33)
VIT B12 SERPL-MCNC: >1500 PG/ML (ref 211–911)
WBC # BLD AUTO: 5.8 K/UL (ref 4.8–10.8)

## 2019-11-15 PROCEDURE — 71045 X-RAY EXAM CHEST 1 VIEW: CPT

## 2019-11-15 PROCEDURE — 82746 ASSAY OF FOLIC ACID SERUM: CPT

## 2019-11-15 PROCEDURE — 700102 HCHG RX REV CODE 250 W/ 637 OVERRIDE(OP): Performed by: INTERNAL MEDICINE

## 2019-11-15 PROCEDURE — 85610 PROTHROMBIN TIME: CPT

## 2019-11-15 PROCEDURE — 700111 HCHG RX REV CODE 636 W/ 250 OVERRIDE (IP): Performed by: INTERNAL MEDICINE

## 2019-11-15 PROCEDURE — 82607 VITAMIN B-12: CPT

## 2019-11-15 PROCEDURE — 84100 ASSAY OF PHOSPHORUS: CPT

## 2019-11-15 PROCEDURE — A9270 NON-COVERED ITEM OR SERVICE: HCPCS | Performed by: INTERNAL MEDICINE

## 2019-11-15 PROCEDURE — 85027 COMPLETE CBC AUTOMATED: CPT

## 2019-11-15 PROCEDURE — 36415 COLL VENOUS BLD VENIPUNCTURE: CPT

## 2019-11-15 PROCEDURE — 700105 HCHG RX REV CODE 258: Performed by: INTERNAL MEDICINE

## 2019-11-15 PROCEDURE — 83735 ASSAY OF MAGNESIUM: CPT

## 2019-11-15 PROCEDURE — 80053 COMPREHEN METABOLIC PANEL: CPT

## 2019-11-15 PROCEDURE — 84443 ASSAY THYROID STIM HORMONE: CPT

## 2019-11-15 PROCEDURE — 700101 HCHG RX REV CODE 250: Performed by: INTERNAL MEDICINE

## 2019-11-15 PROCEDURE — 770020 HCHG ROOM/CARE - TELE (206)

## 2019-11-15 PROCEDURE — 99232 SBSQ HOSP IP/OBS MODERATE 35: CPT | Performed by: INTERNAL MEDICINE

## 2019-11-15 PROCEDURE — 84145 PROCALCITONIN (PCT): CPT

## 2019-11-15 RX ORDER — LOPERAMIDE HYDROCHLORIDE 2 MG/1
2 CAPSULE ORAL 4 TIMES DAILY PRN
Status: DISCONTINUED | OUTPATIENT
Start: 2019-11-15 | End: 2019-11-17 | Stop reason: HOSPADM

## 2019-11-15 RX ORDER — POTASSIUM CHLORIDE 20 MEQ/1
40 TABLET, EXTENDED RELEASE ORAL EVERY 4 HOURS
Status: COMPLETED | OUTPATIENT
Start: 2019-11-15 | End: 2019-11-15

## 2019-11-15 RX ORDER — MAGNESIUM SULFATE HEPTAHYDRATE 40 MG/ML
2 INJECTION, SOLUTION INTRAVENOUS ONCE
Status: COMPLETED | OUTPATIENT
Start: 2019-11-15 | End: 2019-11-15

## 2019-11-15 RX ADMIN — MAGNESIUM SULFATE IN WATER 2 G: 40 INJECTION, SOLUTION INTRAVENOUS at 08:15

## 2019-11-15 RX ADMIN — AMPICILLIN SODIUM AND SULBACTAM SODIUM 3 G: 2; 1 INJECTION, POWDER, FOR SOLUTION INTRAMUSCULAR; INTRAVENOUS at 23:12

## 2019-11-15 RX ADMIN — AMPICILLIN SODIUM AND SULBACTAM SODIUM 3 G: 2; 1 INJECTION, POWDER, FOR SOLUTION INTRAMUSCULAR; INTRAVENOUS at 06:13

## 2019-11-15 RX ADMIN — AZITHROMYCIN 500 MG: 250 TABLET, FILM COATED ORAL at 04:45

## 2019-11-15 RX ADMIN — ASPIRIN 81 MG 81 MG: 81 TABLET ORAL at 04:45

## 2019-11-15 RX ADMIN — FOLIC ACID 1 MG: 1 TABLET ORAL at 04:45

## 2019-11-15 RX ADMIN — AMPICILLIN SODIUM AND SULBACTAM SODIUM 3 G: 2; 1 INJECTION, POWDER, FOR SOLUTION INTRAMUSCULAR; INTRAVENOUS at 16:44

## 2019-11-15 RX ADMIN — AMPICILLIN SODIUM AND SULBACTAM SODIUM 3 G: 2; 1 INJECTION, POWDER, FOR SOLUTION INTRAMUSCULAR; INTRAVENOUS at 12:06

## 2019-11-15 RX ADMIN — SODIUM CHLORIDE: 9 INJECTION, SOLUTION INTRAVENOUS at 06:13

## 2019-11-15 RX ADMIN — ENOXAPARIN SODIUM 40 MG: 100 INJECTION SUBCUTANEOUS at 04:44

## 2019-11-15 RX ADMIN — NICOTINE 14 MG: 14 PATCH TRANSDERMAL at 04:44

## 2019-11-15 RX ADMIN — Medication 100 MG: at 04:50

## 2019-11-15 RX ADMIN — POTASSIUM CHLORIDE 40 MEQ: 1500 TABLET, EXTENDED RELEASE ORAL at 08:19

## 2019-11-15 RX ADMIN — POTASSIUM PHOSPHATE, MONOBASIC AND POTASSIUM PHOSPHATE, DIBASIC 30 MMOL: 224; 236 INJECTION, SOLUTION, CONCENTRATE INTRAVENOUS at 10:24

## 2019-11-15 RX ADMIN — LOPERAMIDE HYDROCHLORIDE 2 MG: 2 CAPSULE ORAL at 23:11

## 2019-11-15 RX ADMIN — POTASSIUM CHLORIDE 40 MEQ: 1500 TABLET, EXTENDED RELEASE ORAL at 12:06

## 2019-11-15 RX ADMIN — THERA TABS 1 TABLET: TAB at 04:45

## 2019-11-15 SDOH — ECONOMIC STABILITY: TRANSPORTATION INSECURITY
IN THE PAST 12 MONTHS, HAS THE LACK OF TRANSPORTATION KEPT YOU FROM MEDICAL APPOINTMENTS OR FROM GETTING MEDICATIONS?: NO

## 2019-11-15 SDOH — ECONOMIC STABILITY: FOOD INSECURITY: WITHIN THE PAST 12 MONTHS, YOU WORRIED THAT YOUR FOOD WOULD RUN OUT BEFORE YOU GOT MONEY TO BUY MORE.: OFTEN TRUE

## 2019-11-15 SDOH — ECONOMIC STABILITY: INCOME INSECURITY: HOW HARD IS IT FOR YOU TO PAY FOR THE VERY BASICS LIKE FOOD, HOUSING, MEDICAL CARE, AND HEATING?: SOMEWHAT HARD

## 2019-11-15 SDOH — ECONOMIC STABILITY: FOOD INSECURITY: WITHIN THE PAST 12 MONTHS, THE FOOD YOU BOUGHT JUST DIDN'T LAST AND YOU DIDN'T HAVE MONEY TO GET MORE.: OFTEN TRUE

## 2019-11-15 SDOH — ECONOMIC STABILITY: TRANSPORTATION INSECURITY
IN THE PAST 12 MONTHS, HAS LACK OF TRANSPORTATION KEPT YOU FROM MEETINGS, WORK, OR FROM GETTING THINGS NEEDED FOR DAILY LIVING?: NO

## 2019-11-15 ASSESSMENT — ENCOUNTER SYMPTOMS
BLOOD IN STOOL: 0
DEPRESSION: 0
DOUBLE VISION: 0
SPUTUM PRODUCTION: 1
FLANK PAIN: 0
SHORTNESS OF BREATH: 1
WHEEZING: 0
HEMOPTYSIS: 0
FALLS: 0
MYALGIAS: 0
DIAPHORESIS: 0
DIZZINESS: 0
VOMITING: 0
PND: 0
NAUSEA: 0
BLURRED VISION: 0
HEADACHES: 0
NECK PAIN: 0
CHILLS: 0
PALPITATIONS: 0
ABDOMINAL PAIN: 0
WEIGHT LOSS: 0
CONSTIPATION: 0
HEARTBURN: 0
BACK PAIN: 0
FEVER: 0
COUGH: 1
DIARRHEA: 0

## 2019-11-15 ASSESSMENT — LIFESTYLE VARIABLES
TREMOR: NO TREMOR
ANXIETY: NO ANXIETY (AT EASE)
AGITATION: NORMAL ACTIVITY
VISUAL DISTURBANCES: NOT PRESENT
AGITATION: NORMAL ACTIVITY
AGITATION: NORMAL ACTIVITY
VISUAL DISTURBANCES: NOT PRESENT
VISUAL DISTURBANCES: NOT PRESENT
NAUSEA AND VOMITING: NO NAUSEA AND NO VOMITING
HEADACHE, FULLNESS IN HEAD: NOT PRESENT
TOTAL SCORE: 0
ORIENTATION AND CLOUDING OF SENSORIUM: ORIENTED AND CAN DO SERIAL ADDITIONS
AUDITORY DISTURBANCES: NOT PRESENT
PAROXYSMAL SWEATS: NO SWEAT VISIBLE
ANXIETY: NO ANXIETY (AT EASE)
TOTAL SCORE: 0
PAROXYSMAL SWEATS: NO SWEAT VISIBLE
ORIENTATION AND CLOUDING OF SENSORIUM: ORIENTED AND CAN DO SERIAL ADDITIONS
ORIENTATION AND CLOUDING OF SENSORIUM: ORIENTED AND CAN DO SERIAL ADDITIONS
TOTAL SCORE: 0
NAUSEA AND VOMITING: NO NAUSEA AND NO VOMITING
ANXIETY: NO ANXIETY (AT EASE)
NAUSEA AND VOMITING: NO NAUSEA AND NO VOMITING
HEADACHE, FULLNESS IN HEAD: NOT PRESENT
AUDITORY DISTURBANCES: NOT PRESENT
HEADACHE, FULLNESS IN HEAD: NOT PRESENT
PAROXYSMAL SWEATS: NO SWEAT VISIBLE
TREMOR: NO TREMOR
TREMOR: NO TREMOR
AUDITORY DISTURBANCES: NOT PRESENT

## 2019-11-15 NOTE — NON-PROVIDER
11/14/19    KARYN is a 44 year old  male with a history of pulmonary embolism was admitted to the hospital with chief complaint of shortness of breath on 11/13/2019 ongoing for 10 days. His admitting diagnosis was community acquired pneumonia. His CAT scan was significant for evolving multifocal pneumonia, emphysema, mild mediastinal lymphadenopathy, hepatic steatosis and splenomegaly but no pulmonary embolism at this time. C-XRAY showed minimal bibasilar opacities, atelectasis or chronic interstitial change. Yesterday, he had complaints of left back pain radiating to the LUQ, intermittent SOB, non-productive cough, sweating and chest pain. Today he reports his symptoms are getting better. He rates left lower back pain at a 3/10 from a 9/10 yesterday. Shortness of breath is intermittent. He states he still has non-productive cough. Overall, he notes his symptoms are resolving and he is feeling better.     Objective:   Vitals:    11/15/19 0800   BP: 108/72   Pulse: 74   Resp: 19   Temp: 37.2 °C (98.9 °F)   SpO2:

## 2019-11-15 NOTE — DISCHARGE PLANNING
Pt identified as possible discharge tomorrow. Pt is from [place].   He states that he plans to take the train home tomorrow and has already booked his ticket online. The train leaves [place] at 1600 tomorrow. Pt can pay for a cab to get there once discharged.  CM provided information for [facility] to establish a PCP while his Medicaid is pending. Pt states that he was in the process of doing this when he was admitted to the hospital and that he will follow up. Pt has money to fill his prescriptions also.  Pt states that his employer, [employer], needs a letter stating that he is cleared to return to work and if there are any restrictions. 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  BENEDICT sent Dr. Rodas a tiger text to let him know and also let him know pt's train will leave at 1600 tomorrow.   Care Transition Team Assessment    Information Source  Orientation : Oriented x 4  Information Given By: Patient         Elopement Risk  Legal Hold: No  Ambulatory or Self Mobile in Wheelchair: Yes  Disoriented: No  Psychiatric Symptoms: None  History of Wandering: No  Elopement this Admit: No  Vocalizing Wanting to Leave: No  Displays Behaviors, Body Language Wanting to Leave: No-Not at Risk for Elopement  Elopement Risk: Not at Risk for Elopement    Interdisciplinary Discharge Planning  Patient or legal guardian wants to designate a caregiver (see row info): No                   Vision / Hearing Impairment  Vision Impairment : Yes  Right Eye Vision: Impaired, Wears Glasses  Left Eye Vision: Impaired, Wears Glasses  Hearing Impairment : No              Domestic Abuse  Have you ever been the victim of abuse or violence?: No  Physical Abuse or Sexual Abuse: No  Verbal Abuse or Emotional Abuse: No  Possible Abuse Reported to:: Not Applicable

## 2019-11-15 NOTE — PROGRESS NOTES
Pt resting in bed. Pt AAOx4. Pt denies pain a this time. Tele monitor in place.Call bell in reach. VSS. Will continue to monitor.

## 2019-11-15 NOTE — PROGRESS NOTES
[facility] Medicine Daily Progress Note    Date of Service  11/14/2019    Chief Complaint  44 y.o. male admitted 11/13/2019 with cough, shortness of breath    Hospital Course    Patient admitted with community-acquired pneumonia in the setting of presentation of cough and shortness of breath      Interval Problem Update  Patient seen and evaluated on rounds  Discussed with nursing staff on rounds  Still having intermittent cough, productive, green-colored sputum  Intermittent shortness of breath, improving  Slight headaches  No symptoms of alcohol withdrawal, has never had alcohol withdrawal, last drink last Sunday  Half a pack smoker prior to presentation, nicotine patch advised  No other acute issues/complaints, eager to discharge home at the earliest possible    Consultants/Specialty  None    Code Status  FULL CODE     Disposition  Anticipate home once medically cleared     Review of Systems  Review of Systems   Constitutional: Negative for chills, diaphoresis, fever, malaise/fatigue and weight loss.   HENT: Negative for hearing loss and tinnitus.    Eyes: Negative for blurred vision and double vision.   Respiratory: Positive for cough, sputum production and shortness of breath. Negative for hemoptysis and wheezing.    Cardiovascular: Negative for chest pain, palpitations and PND.   Gastrointestinal: Negative for abdominal pain, blood in stool, constipation, diarrhea, heartburn, melena, nausea and vomiting.   Genitourinary: Negative for dysuria, flank pain, frequency, hematuria and urgency.   Musculoskeletal: Negative for back pain, falls, joint pain, myalgias and neck pain.   Skin: Negative for itching and rash.   Neurological: Negative for dizziness and headaches.   Psychiatric/Behavioral: Negative for depression and suicidal ideas.        Physical Exam  Temp:  [36.3 °C (97.4 °F)-38.1 °C (100.6 °F)] 36.8 °C (98.2 °F)  Pulse:  [] 90  Resp:  [17-20] 18  BP: (100-117)/(35-77) 112/72  SpO2:  [93 %-96 %] 94  %    Physical Exam  HENT:      Head: Normocephalic.      Right Ear: External ear normal.      Left Ear: External ear normal.      Nose: Nose normal.      Mouth/Throat:      Mouth: Mucous membranes are moist.   Eyes:      General: No scleral icterus.     Conjunctiva/sclera: Conjunctivae normal.      Pupils: Pupils are equal, round, and reactive to light.   Neck:      Musculoskeletal: Normal range of motion and neck supple.   Cardiovascular:      Rate and Rhythm: Normal rate and regular rhythm.      Pulses: Normal pulses.      Heart sounds: Normal heart sounds. No murmur. No friction rub. No gallop.    Pulmonary:      Effort: Pulmonary effort is normal. No respiratory distress.      Breath sounds: No stridor. Rhonchi and rales present. No wheezing.   Abdominal:      General: Abdomen is flat. Bowel sounds are normal. There is no distension.      Palpations: There is no mass.      Tenderness: There is no tenderness. There is no right CVA tenderness, left CVA tenderness, guarding or rebound.      Hernia: No hernia is present.   Musculoskeletal: Normal range of motion.   Skin:     General: Skin is warm and dry.      Capillary Refill: Capillary refill takes less than 2 seconds.      Coloration: Skin is not jaundiced.      Findings: No erythema.   Neurological:      General: No focal deficit present.      Mental Status: He is alert and oriented to person, place, and time. Mental status is at baseline.   Psychiatric:         Mood and Affect: Mood normal.         Behavior: Behavior normal.         Thought Content: Thought content normal.         Judgment: Judgment normal.         Fluids    Intake/Output Summary (Last 24 hours) at 11/14/2019 1659  Last data filed at 11/14/2019 0900  Gross per 24 hour   Intake 440 ml   Output --   Net 440 ml       Laboratory  Recent Labs     11/13/19  1640 11/14/19  0714   WBC 4.4* 5.8   RBC 4.09* 3.80*   HEMOGLOBIN 12.7* 11.5*   HEMATOCRIT 35.3* 33.0*   MCV 86.3 86.8   MCH 31.1 30.3   MCHC  36.0* 34.8   RDW 46.2 47.7   PLATELETCT 78* 80*   MPV 12.8 12.8     Recent Labs     11/13/19  1640 11/14/19  0714   SODIUM 135 130*   POTASSIUM 3.1* 3.1*   CHLORIDE 102 101   CO2 24 24   GLUCOSE 117* 100*   BUN 5* 5*   CREATININE 0.46* 0.47*   CALCIUM 7.5* 7.6*                   Imaging  CT-CTA CHEST PULMONARY ARTERY W/ RECONS   Final Result      1.  No pulmonary embolus.   2.  Evolving multifocal pneumonia.   3.  Emphysema.   4.  Mild mediastinal lymphadenopathy, which is likely reactive and related to pneumonia. Consider follow-up chest CT in 2-3 months upon pneumonia resolution.   5.  Hepatic steatosis.   6.  Splenomegaly.            OUTSIDE IMAGES-DX CHEST   Final Result      OUTSIDE IMAGES-CT CHEST   Final Result           Assessment/Plan  Community acquired pneumonia- (present on admission)  Assessment & Plan  Patient has underlying multifocal community-acquired pneumonia  Continue intravenous Unasyn and azithromycin  De-escalate to oral antibiotic therapy as clinically appropriate  Findings of mediastinal lymphadenopathy, patient will require interval chest x-ray in 2 weeks with PCP and 4 weeks CT chest with PCP following discharge  Continue close clinical monitoring    Alcohol dependence (HCC)- (present on admission)  Assessment & Plan  Last drink, last Sunday  No signs of acute alcohol withdrawal at this time  On CIWA protocol, supplementation of benzodiazepines as clinically appropriate per [facility] protocol  Of note, patient not forthcoming regarding this  Counseled and educated regarding cessation respectively    Alcoholic hepatitis without ascites- (present on admission)  Assessment & Plan  Monitor LFTs and INR  Interval CMP, INR in the morning tomorrow  Counseled and educated regarding alcohol cessation  Optimize nutrition    Hypokalemia- (present on admission)  Assessment & Plan  Replaced, monitor    Nicotine abuse- (present on admission)  Assessment & Plan  Patient counseled and educated regarding  smoking cessation    Pancytopenia (HCC)- (present on admission)  Assessment & Plan  Suspect related to chronic alcoholism  Alcohol cessation reinforced  Recommend outpatient PCP follow-up, monitoring and evaluation by PCP if persistent, including referral to hematology       VTE prophylaxis: SC Lovenox

## 2019-11-15 NOTE — CARE PLAN
Problem: Communication  Goal: The ability to communicate needs accurately and effectively will improve  Outcome: PROGRESSING AS EXPECTED  Note:   POC discussed with pt and family. US results discussed. All questions answered. Pt verbalized understanding.      Problem: Infection  Goal: Will remain free from infection  Outcome: PROGRESSING AS EXPECTED  Note:   IV Abx ordered.

## 2019-11-15 NOTE — PROGRESS NOTES
Community Health Worker Intake  • Social determinates of health intake complete.   • Identified barriers to food, and paying for resources.  • Contact information provided to Valeriano Willing.      11/15. CHW Sepideh met with pt to introduce CCM services. Pt has no pcp. No transportation issues getting to medical appointments. Pt indicated he 'probably' can use his old truck. I informed pt about MTM if his medicaid gets approved. He indicated a 'little bit' of food insecurity. He was worried about making his food last. He expressed trouble paying for resources such as care, and housing. Pt indicated he is on FMLA leave right now. His support system is his family. He currently lives in a trailer in [place]. Pt is not confident in managing his health after d/c.     Plan: Follow up call after d/c.    11/20: Outcome. Spoke to Valeriano. He indicated he is doing well. He indicated he left AMA because the hospital wanted him to stay another night when he was already feeling well. He was wondering why he did not get a list of medications to , and I informed him that it may be due to leaving AMA. He indicated he has gone a few days without medications. He called his pharmacy and no orders for medications was sent. I asked Valeriano if he will be establishing a pcp with [facility], and he said he will when he has time. He declined assistance with making the appointment. I informed him the importance of having a pcp. I informed him that a pcp can provide prescription medications. I advised him to give me a call if he needs assistance with making a pcp appointment. No transportation issues getting to medical appointments. I provided contact information for [employer] because he expressed food insecurity. Valeriano needed rent assistance resources, and I provided contact information for Emergency Assistance Program. Valeriano declined to talk to a nurse regarding his health. He indicated he does not need any  other resources at this time. Valeriano will be d/c from [facility] services.

## 2019-11-16 LAB
ALBUMIN SERPL BCP-MCNC: 2.3 G/DL (ref 3.2–4.9)
ALBUMIN/GLOB SERPL: 0.6 G/DL
ALP SERPL-CCNC: 306 U/L (ref 30–99)
ALT SERPL-CCNC: 115 U/L (ref 2–50)
ANION GAP SERPL CALC-SCNC: 5 MMOL/L (ref 0–11.9)
ANISOCYTOSIS BLD QL SMEAR: ABNORMAL
AST SERPL-CCNC: 172 U/L (ref 12–45)
BASOPHILS # BLD AUTO: 0 % (ref 0–1.8)
BASOPHILS # BLD: 0 K/UL (ref 0–0.12)
BILIRUB SERPL-MCNC: 1.3 MG/DL (ref 0.1–1.5)
BUN SERPL-MCNC: 6 MG/DL (ref 8–22)
CALCIUM SERPL-MCNC: 8.1 MG/DL (ref 8.5–10.5)
CHLORIDE SERPL-SCNC: 106 MMOL/L (ref 96–112)
CO2 SERPL-SCNC: 22 MMOL/L (ref 20–33)
CREAT SERPL-MCNC: 0.42 MG/DL (ref 0.5–1.4)
EOSINOPHIL # BLD AUTO: 0 K/UL (ref 0–0.51)
EOSINOPHIL NFR BLD: 0 % (ref 0–6.9)
ERYTHROCYTE [DISTWIDTH] IN BLOOD BY AUTOMATED COUNT: 51.2 FL (ref 35.9–50)
GLOBULIN SER CALC-MCNC: 4.1 G/DL (ref 1.9–3.5)
GLUCOSE SERPL-MCNC: 96 MG/DL (ref 65–99)
HCT VFR BLD AUTO: 34.6 % (ref 42–52)
HGB BLD-MCNC: 11.9 G/DL (ref 14–18)
LYMPHOCYTES # BLD AUTO: 6 K/UL (ref 1–4.8)
LYMPHOCYTES NFR BLD: 87 % (ref 22–41)
MACROCYTES BLD QL SMEAR: ABNORMAL
MAGNESIUM SERPL-MCNC: 1.8 MG/DL (ref 1.5–2.5)
MANUAL DIFF BLD: NORMAL
MCH RBC QN AUTO: 30.4 PG (ref 27–33)
MCHC RBC AUTO-ENTMCNC: 34.4 G/DL (ref 33.7–35.3)
MCV RBC AUTO: 88.3 FL (ref 81.4–97.8)
MONOCYTES # BLD AUTO: 0.26 K/UL (ref 0–0.85)
MONOCYTES NFR BLD AUTO: 3.7 % (ref 0–13.4)
MORPHOLOGY BLD-IMP: NORMAL
NEUTROPHILS # BLD AUTO: 0.64 K/UL (ref 1.82–7.42)
NEUTROPHILS NFR BLD: 9.3 % (ref 44–72)
NRBC # BLD AUTO: 0 K/UL
NRBC BLD-RTO: 0 /100 WBC
PHOSPHATE SERPL-MCNC: 3.8 MG/DL (ref 2.5–4.5)
PLATELET # BLD AUTO: 85 K/UL (ref 164–446)
PLATELET BLD QL SMEAR: NORMAL
PMV BLD AUTO: 10.7 FL (ref 9–12.9)
POTASSIUM SERPL-SCNC: 3.6 MMOL/L (ref 3.6–5.5)
PROT SERPL-MCNC: 6.4 G/DL (ref 6–8.2)
RBC # BLD AUTO: 3.92 M/UL (ref 4.7–6.1)
RBC BLD AUTO: PRESENT
SMUDGE CELLS BLD QL SMEAR: NORMAL
SODIUM SERPL-SCNC: 133 MMOL/L (ref 135–145)
WBC # BLD AUTO: 6.9 K/UL (ref 4.8–10.8)

## 2019-11-16 PROCEDURE — 99233 SBSQ HOSP IP/OBS HIGH 50: CPT | Performed by: INTERNAL MEDICINE

## 2019-11-16 PROCEDURE — 80053 COMPREHEN METABOLIC PANEL: CPT

## 2019-11-16 PROCEDURE — 83735 ASSAY OF MAGNESIUM: CPT

## 2019-11-16 PROCEDURE — 85007 BL SMEAR W/DIFF WBC COUNT: CPT

## 2019-11-16 PROCEDURE — 700102 HCHG RX REV CODE 250 W/ 637 OVERRIDE(OP): Performed by: INTERNAL MEDICINE

## 2019-11-16 PROCEDURE — 84100 ASSAY OF PHOSPHORUS: CPT

## 2019-11-16 PROCEDURE — 36415 COLL VENOUS BLD VENIPUNCTURE: CPT

## 2019-11-16 PROCEDURE — A9270 NON-COVERED ITEM OR SERVICE: HCPCS | Performed by: INTERNAL MEDICINE

## 2019-11-16 PROCEDURE — 770020 HCHG ROOM/CARE - TELE (206)

## 2019-11-16 PROCEDURE — 700111 HCHG RX REV CODE 636 W/ 250 OVERRIDE (IP): Performed by: INTERNAL MEDICINE

## 2019-11-16 PROCEDURE — 85027 COMPLETE CBC AUTOMATED: CPT

## 2019-11-16 PROCEDURE — 700105 HCHG RX REV CODE 258: Performed by: INTERNAL MEDICINE

## 2019-11-16 RX ORDER — AMOXICILLIN AND CLAVULANATE POTASSIUM 875; 125 MG/1; MG/1
1 TABLET, FILM COATED ORAL EVERY 12 HOURS
Status: DISCONTINUED | OUTPATIENT
Start: 2019-11-16 | End: 2019-11-17 | Stop reason: HOSPADM

## 2019-11-16 RX ORDER — MAGNESIUM SULFATE HEPTAHYDRATE 40 MG/ML
2 INJECTION, SOLUTION INTRAVENOUS ONCE
Status: COMPLETED | OUTPATIENT
Start: 2019-11-16 | End: 2019-11-16

## 2019-11-16 RX ORDER — VALPROIC ACID 250 MG/1
250 CAPSULE, LIQUID FILLED ORAL EVERY 8 HOURS
Status: DISCONTINUED | OUTPATIENT
Start: 2019-11-16 | End: 2019-11-17 | Stop reason: HOSPADM

## 2019-11-16 RX ORDER — GABAPENTIN 300 MG/1
300 CAPSULE ORAL 3 TIMES DAILY
Status: DISCONTINUED | OUTPATIENT
Start: 2019-11-16 | End: 2019-11-17 | Stop reason: HOSPADM

## 2019-11-16 RX ORDER — POTASSIUM CHLORIDE 20 MEQ/1
40 TABLET, EXTENDED RELEASE ORAL ONCE
Status: COMPLETED | OUTPATIENT
Start: 2019-11-16 | End: 2019-11-16

## 2019-11-16 RX ADMIN — NICOTINE 14 MG: 14 PATCH TRANSDERMAL at 05:41

## 2019-11-16 RX ADMIN — Medication 100 MG: at 05:34

## 2019-11-16 RX ADMIN — ENOXAPARIN SODIUM 40 MG: 100 INJECTION SUBCUTANEOUS at 05:34

## 2019-11-16 RX ADMIN — GABAPENTIN 300 MG: 300 CAPSULE ORAL at 16:37

## 2019-11-16 RX ADMIN — LORAZEPAM 2 MG: 2 TABLET ORAL at 13:21

## 2019-11-16 RX ADMIN — LORAZEPAM 1.5 MG: 2 INJECTION INTRAMUSCULAR; INTRAVENOUS at 04:01

## 2019-11-16 RX ADMIN — AZITHROMYCIN 500 MG: 250 TABLET, FILM COATED ORAL at 05:34

## 2019-11-16 RX ADMIN — LORAZEPAM 2 MG: 2 TABLET ORAL at 08:12

## 2019-11-16 RX ADMIN — THERA TABS 1 TABLET: TAB at 05:34

## 2019-11-16 RX ADMIN — FOLIC ACID 1 MG: 1 TABLET ORAL at 05:34

## 2019-11-16 RX ADMIN — VALPROIC ACID 250 MG: 250 CAPSULE, LIQUID FILLED ORAL at 16:37

## 2019-11-16 RX ADMIN — VALPROIC ACID 250 MG: 250 CAPSULE, LIQUID FILLED ORAL at 22:03

## 2019-11-16 RX ADMIN — ASPIRIN 81 MG 81 MG: 81 TABLET ORAL at 05:34

## 2019-11-16 RX ADMIN — LORAZEPAM 1.5 MG: 2 INJECTION INTRAMUSCULAR; INTRAVENOUS at 00:52

## 2019-11-16 RX ADMIN — MAGNESIUM SULFATE 2 G: 2 INJECTION INTRAVENOUS at 16:45

## 2019-11-16 RX ADMIN — AMPICILLIN SODIUM AND SULBACTAM SODIUM 3 G: 2; 1 INJECTION, POWDER, FOR SOLUTION INTRAMUSCULAR; INTRAVENOUS at 05:34

## 2019-11-16 RX ADMIN — AMOXICILLIN AND CLAVULANATE POTASSIUM 1 TABLET: 875; 125 TABLET, FILM COATED ORAL at 08:08

## 2019-11-16 RX ADMIN — AMOXICILLIN AND CLAVULANATE POTASSIUM 1 TABLET: 875; 125 TABLET, FILM COATED ORAL at 16:37

## 2019-11-16 RX ADMIN — LORAZEPAM 1.5 MG: 2 INJECTION INTRAMUSCULAR; INTRAVENOUS at 06:07

## 2019-11-16 RX ADMIN — POTASSIUM CHLORIDE 40 MEQ: 1500 TABLET, EXTENDED RELEASE ORAL at 08:08

## 2019-11-16 ASSESSMENT — LIFESTYLE VARIABLES
AGITATION: *
NAUSEA AND VOMITING: *
HEADACHE, FULLNESS IN HEAD: VERY MILD
PAROXYSMAL SWEATS: *
VISUAL DISTURBANCES: NOT PRESENT
ORIENTATION AND CLOUDING OF SENSORIUM: ORIENTED AND CAN DO SERIAL ADDITIONS
TREMOR: *
AUDITORY DISTURBANCES: NOT PRESENT
TOTAL SCORE: 2
ORIENTATION AND CLOUDING OF SENSORIUM: ORIENTED AND CAN DO SERIAL ADDITIONS
ORIENTATION AND CLOUDING OF SENSORIUM: ORIENTED AND CAN DO SERIAL ADDITIONS
ANXIETY: *
AUDITORY DISTURBANCES: NOT PRESENT
AGITATION: NORMAL ACTIVITY
HEADACHE, FULLNESS IN HEAD: NOT PRESENT
VISUAL DISTURBANCES: MILD SENSITIVITY
TOTAL SCORE: 2
AUDITORY DISTURBANCES: NOT PRESENT
VISUAL DISTURBANCES: NOT PRESENT
NAUSEA AND VOMITING: NO NAUSEA AND NO VOMITING
AGITATION: NORMAL ACTIVITY
ORIENTATION AND CLOUDING OF SENSORIUM: CANNOT DO SERIAL ADDITIONS OR IS UNCERTAIN ABOUT DATE
PAROXYSMAL SWEATS: BARELY PERCEPTIBLE SWEATING. PALMS MOIST
VISUAL DISTURBANCES: VERY MILD SENSITIVITY
NAUSEA AND VOMITING: NO NAUSEA AND NO VOMITING
ORIENTATION AND CLOUDING OF SENSORIUM: ORIENTED AND CAN DO SERIAL ADDITIONS
AUDITORY DISTURBANCES: NOT PRESENT
AGITATION: MODERATELY FIDGETY AND RESTLESS
AGITATION: SOMEWHAT MORE THAN NORMAL ACTIVITY
HEADACHE, FULLNESS IN HEAD: NOT PRESENT
TOTAL SCORE: 3
AGITATION: NORMAL ACTIVITY
TOTAL SCORE: 4
ORIENTATION AND CLOUDING OF SENSORIUM: ORIENTED AND CAN DO SERIAL ADDITIONS
PAROXYSMAL SWEATS: *
TREMOR: TREMOR NOT VISIBLE BUT CAN BE FELT, FINGERTIP TO FINGERTIP
NAUSEA AND VOMITING: NO NAUSEA AND NO VOMITING
TOTAL SCORE: 4
TREMOR: *
TREMOR: *
TREMOR: MODERATE TREMOR WITH ARMS EXTENDED
NAUSEA AND VOMITING: NO NAUSEA AND NO VOMITING
ANXIETY: NO ANXIETY (AT EASE)
TOTAL SCORE: 18
NAUSEA AND VOMITING: NO NAUSEA AND NO VOMITING
VISUAL DISTURBANCES: NOT PRESENT
AUDITORY DISTURBANCES: NOT PRESENT
TREMOR: *
VISUAL DISTURBANCES: MILD SENSITIVITY
AGITATION: SOMEWHAT MORE THAN NORMAL ACTIVITY
AGITATION: *
ORIENTATION AND CLOUDING OF SENSORIUM: ORIENTED AND CAN DO SERIAL ADDITIONS
AUDITORY DISTURBANCES: NOT PRESENT
TOTAL SCORE: 5
ANXIETY: *
AGITATION: MODERATELY FIDGETY AND RESTLESS
TOTAL SCORE: 1
TREMOR: NO TREMOR
ORIENTATION AND CLOUDING OF SENSORIUM: ORIENTED AND CAN DO SERIAL ADDITIONS
AGITATION: NORMAL ACTIVITY
PAROXYSMAL SWEATS: BARELY PERCEPTIBLE SWEATING. PALMS MOIST
ANXIETY: MODERATELY ANXIOUS OR GUARDED, SO ANXIETY IS INFERRED
AUDITORY DISTURBANCES: NOT PRESENT
NAUSEA AND VOMITING: NO NAUSEA AND NO VOMITING
AUDITORY DISTURBANCES: VERY MILD HARSHNESS OR ABILITY TO FRIGHTEN
ORIENTATION AND CLOUDING OF SENSORIUM: ORIENTED AND CAN DO SERIAL ADDITIONS
TOTAL SCORE: 20
PAROXYSMAL SWEATS: *
TOTAL SCORE: 13
HEADACHE, FULLNESS IN HEAD: NOT PRESENT
NAUSEA AND VOMITING: NO NAUSEA AND NO VOMITING
PAROXYSMAL SWEATS: BARELY PERCEPTIBLE SWEATING. PALMS MOIST
HEADACHE, FULLNESS IN HEAD: NOT PRESENT
VISUAL DISTURBANCES: NOT PRESENT
AGITATION: *
PAROXYSMAL SWEATS: BEADS OF SWEAT OBVIOUS ON FOREHEAD
NAUSEA AND VOMITING: *
HEADACHE, FULLNESS IN HEAD: NOT PRESENT
NAUSEA AND VOMITING: NO NAUSEA AND NO VOMITING
ANXIETY: NO ANXIETY (AT EASE)
TREMOR: *
VISUAL DISTURBANCES: NOT PRESENT
ORIENTATION AND CLOUDING OF SENSORIUM: ORIENTED AND CAN DO SERIAL ADDITIONS
ANXIETY: NO ANXIETY (AT EASE)
HEADACHE, FULLNESS IN HEAD: MILD
TREMOR: NO TREMOR
AUDITORY DISTURBANCES: NOT PRESENT
TREMOR: TREMOR NOT VISIBLE BUT CAN BE FELT, FINGERTIP TO FINGERTIP
HEADACHE, FULLNESS IN HEAD: NOT PRESENT
VISUAL DISTURBANCES: NOT PRESENT
HEADACHE, FULLNESS IN HEAD: NOT PRESENT
ANXIETY: MODERATELY ANXIOUS OR GUARDED, SO ANXIETY IS INFERRED
NAUSEA AND VOMITING: NO NAUSEA AND NO VOMITING
HEADACHE, FULLNESS IN HEAD: NOT PRESENT
PAROXYSMAL SWEATS: *
AGITATION: NORMAL ACTIVITY
PAROXYSMAL SWEATS: BARELY PERCEPTIBLE SWEATING. PALMS MOIST
PAROXYSMAL SWEATS: *
ANXIETY: MILDLY ANXIOUS
ANXIETY: MODERATELY ANXIOUS OR GUARDED, SO ANXIETY IS INFERRED
TREMOR: *
TREMOR: *
ORIENTATION AND CLOUDING OF SENSORIUM: ORIENTED AND CAN DO SERIAL ADDITIONS
VISUAL DISTURBANCES: NOT PRESENT
ORIENTATION AND CLOUDING OF SENSORIUM: ORIENTED AND CAN DO SERIAL ADDITIONS
HEADACHE, FULLNESS IN HEAD: NOT PRESENT
ANXIETY: NO ANXIETY (AT EASE)
VISUAL DISTURBANCES: NOT PRESENT
NAUSEA AND VOMITING: NO NAUSEA AND NO VOMITING
AUDITORY DISTURBANCES: NOT PRESENT
AUDITORY DISTURBANCES: VERY MILD HARSHNESS OR ABILITY TO FRIGHTEN
AUDITORY DISTURBANCES: NOT PRESENT
PAROXYSMAL SWEATS: *
HEADACHE, FULLNESS IN HEAD: NOT PRESENT
ANXIETY: NO ANXIETY (AT EASE)
TOTAL SCORE: 13
PAROXYSMAL SWEATS: BARELY PERCEPTIBLE SWEATING. PALMS MOIST
VISUAL DISTURBANCES: NOT PRESENT
ANXIETY: NO ANXIETY (AT EASE)
TOTAL SCORE: 18

## 2019-11-16 ASSESSMENT — ENCOUNTER SYMPTOMS
DIARRHEA: 0
BACK PAIN: 0
VOMITING: 0
FALLS: 0
CHILLS: 0
PALPITATIONS: 0
BLURRED VISION: 0
HEARTBURN: 0
CONSTIPATION: 0
SPUTUM PRODUCTION: 1
FLANK PAIN: 0
WHEEZING: 0
COUGH: 1
DIZZINESS: 0
DIAPHORESIS: 0
NECK PAIN: 0
HEADACHES: 0
WEIGHT LOSS: 0
MYALGIAS: 0
BLOOD IN STOOL: 0
ABDOMINAL PAIN: 0
FEVER: 0
NAUSEA: 0
SHORTNESS OF BREATH: 1
PND: 0
HEMOPTYSIS: 0
DOUBLE VISION: 0
DEPRESSION: 0

## 2019-11-16 NOTE — PROGRESS NOTES
[facility] Medicine Daily Progress Note    Date of Service  11/16/2019    Chief Complaint  44 y.o. male admitted 11/13/2019 with cough, shortness of breath    Hospital Course    Patient admitted with community-acquired pneumonia in the setting of presentation of cough and shortness of breath      Interval Problem Update  Patient seen and evaluated on rounds  Discussed with nursing staff on rounds  Interval development of ETOH withdrawal  Confused last night   HIGH CIWA SCORES   Florid withdrawal   Discharge held   Gabapentin / VA added  Continue close clinical monitoring     Consultants/Specialty  None    Code Status  FULL CODE     Disposition  Home once ETOH withdrawal is better     Review of Systems  Review of Systems   Constitutional: Negative for chills, diaphoresis, fever, malaise/fatigue and weight loss.   HENT: Negative for hearing loss and tinnitus.    Eyes: Negative for blurred vision and double vision.   Respiratory: Positive for cough, sputum production and shortness of breath. Negative for hemoptysis and wheezing.    Cardiovascular: Negative for chest pain, palpitations and PND.   Gastrointestinal: Negative for abdominal pain, blood in stool, constipation, diarrhea, heartburn, melena, nausea and vomiting.   Genitourinary: Negative for dysuria, flank pain, frequency, hematuria and urgency.   Musculoskeletal: Negative for back pain, falls, joint pain, myalgias and neck pain.   Skin: Negative for itching and rash.   Neurological: Negative for dizziness and headaches.   Psychiatric/Behavioral: Negative for depression and suicidal ideas.        Physical Exam  Temp:  [36.1 °C (96.9 °F)-36.8 °C (98.3 °F)] 36.3 °C (97.3 °F)  Pulse:  [64-98] 98  Resp:  [14-17] 16  BP: (117-136)/(66-83) 129/66  SpO2:  [95 %-98 %] 95 %    Physical Exam  HENT:      Head: Normocephalic.      Right Ear: External ear normal.      Left Ear: External ear normal.      Nose: Nose normal.      Mouth/Throat:      Mouth: Mucous membranes are  moist.   Eyes:      General: No scleral icterus.     Conjunctiva/sclera: Conjunctivae normal.      Pupils: Pupils are equal, round, and reactive to light.   Neck:      Musculoskeletal: Normal range of motion and neck supple.   Cardiovascular:      Rate and Rhythm: Normal rate and regular rhythm.      Pulses: Normal pulses.      Heart sounds: Normal heart sounds. No murmur. No friction rub. No gallop.    Pulmonary:      Effort: Pulmonary effort is normal. No respiratory distress.      Breath sounds: No stridor. Rhonchi and rales present. No wheezing.   Abdominal:      General: Abdomen is flat. Bowel sounds are normal. There is no distension.      Palpations: There is no mass.      Tenderness: There is no tenderness. There is no right CVA tenderness, left CVA tenderness, guarding or rebound.      Hernia: No hernia is present.   Musculoskeletal: Normal range of motion.   Skin:     General: Skin is warm and dry.      Capillary Refill: Capillary refill takes less than 2 seconds.      Coloration: Skin is not jaundiced.      Findings: No erythema.   Neurological:      General: No focal deficit present.      Mental Status: He is alert and oriented to person, place, and time. Mental status is at baseline.      Comments: Tremors and slowed compared to yesterday - confused last night    Psychiatric:         Mood and Affect: Mood normal.         Behavior: Behavior normal.         Thought Content: Thought content normal.         Judgment: Judgment normal.         Fluids  No intake or output data in the 24 hours ending 11/16/19 1542    Laboratory  Recent Labs     11/14/19  0714 11/15/19  0356 11/16/19  0310   WBC 5.8 5.8 6.9   RBC 3.80* 3.92* 3.92*   HEMOGLOBIN 11.5* 11.9* 11.9*   HEMATOCRIT 33.0* 34.0* 34.6*   MCV 86.8 86.7 88.3   MCH 30.3 30.4 30.4   MCHC 34.8 35.0 34.4   RDW 47.7 48.2 51.2*   PLATELETCT 80* 80* 85*   MPV 12.8 11.5 10.7     Recent Labs     11/14/19  0714 11/15/19  0356 11/16/19  0310   SODIUM 130* 133* 133*    POTASSIUM 3.1* 3.2* 3.6   CHLORIDE 101 106 106   CO2 24 22 22   GLUCOSE 100* 91 96   BUN 5* 4* 6*   CREATININE 0.47* 0.39* 0.42*   CALCIUM 7.6* 7.4* 8.1*     Recent Labs     11/15/19  0356   INR 0.98               Imaging  DX-CHEST-LIMITED (1 VIEW)   Final Result         Minimal bibasilar opacities, atelectasis or chronic interstitial change.      CT-CTA CHEST PULMONARY ARTERY W/ RECONS   Final Result      1.  No pulmonary embolus.   2.  Evolving multifocal pneumonia.   3.  Emphysema.   4.  Mild mediastinal lymphadenopathy, which is likely reactive and related to pneumonia. Consider follow-up chest CT in 2-3 months upon pneumonia resolution.   5.  Hepatic steatosis.   6.  Splenomegaly.            OUTSIDE IMAGES-DX CHEST   Final Result      OUTSIDE IMAGES-CT CHEST   Final Result           Assessment/Plan  Community acquired pneumonia- (present on admission)  Assessment & Plan  Patient has underlying multifocal community-acquired pneumonia  Completed azithromycin  De escalated to PO Augmentin - stop dates in place   Findings of mediastinal lymphadenopathy, patient will require interval chest x-ray in 2 weeks with PCP and 4 weeks CT chest with PCP following discharge  Continue close clinical monitoring    Alcohol dependence (HCC)- (present on admission)  Assessment & Plan  Last drink, last Sunday  In acute ETOH withdrawal at this time   On CIWA protocol, supplementation of benzodiazepines as clinically appropriate per CIWA protocol  Added gabapentin  VA - monitor LFTs - if worsening stop this   Counseled and educated regarding cessation respectively  Continue close clinical monitoring   If worsening transfer ICU for phenobarbital protocol     Alcoholic hepatitis without ascites- (present on admission)  Assessment & Plan  Monitor LFTs and INR  Interval CMP, INR in the morning tomorrow  Counseled and educated regarding alcohol cessation  Optimize nutrition  Improving     Hypokalemia- (present on admission)  Assessment  & Plan  Replaced, monitor    Nicotine abuse- (present on admission)  Assessment & Plan  Patient counseled and educated regarding smoking cessation    Pancytopenia (HCC)- (present on admission)  Assessment & Plan  Suspect related to chronic alcoholism  Alcohol cessation reinforced  Recommend outpatient PCP follow-up, monitoring and evaluation by PCP if persistent, including referral to hematology       VTE prophylaxis: SC Lovenox

## 2019-11-16 NOTE — PROGRESS NOTES
Monitor summary:  SR 64-99, high as 160  Rare PVC and PAC  0.16/0.10/0.32   The patient is a 42y Female complaining of trauma.

## 2019-11-16 NOTE — DISCHARGE INSTRUCTIONS
Discharge Instructions    Discharged to home by car with relative. Discharged via wheelchair, hospital escort: Yes.  Special equipment needed: Not Applicable    Be sure to schedule a follow-up appointment with your primary care doctor or any specialists as instructed.     Discharge Plan:   Smoking Cessation Offered: Patient Counseled  Influenza Vaccine Indication: Patient Refuses    I understand that a diet low in cholesterol, fat, and sodium is recommended for good health. Unless I have been given specific instructions below for another diet, I accept this instruction as my diet prescription.   Other diet: Regular    Special Instructions: None    · Is patient discharged on Warfarin / Coumadin?   No       Community-Acquired Pneumonia, Adult  Pneumonia is an infection of the lungs. There are different types of pneumonia. One type can develop while a person is in a hospital. A different type, called community-acquired pneumonia, develops in people who are not, or have not recently been, in the hospital or other health care facility.  What are the causes?  Pneumonia may be caused by bacteria, viruses, or funguses. Community-acquired pneumonia is often caused by Streptococcus pneumonia bacteria. These bacteria are often passed from one person to another by breathing in droplets from the cough or sneeze of an infected person.  What increases the risk?  The condition is more likely to develop in:  · People who have chronic diseases, such as chronic obstructive pulmonary disease (COPD), asthma, congestive heart failure, cystic fibrosis, diabetes, or kidney disease.  · People who have early-stage or late-stage HIV.  · People who have sickle cell disease.  · People who have had their spleen removed (splenectomy).  · People who have poor dental hygiene.  · People who have medical conditions that increase the risk of breathing in (aspirating) secretions their own mouth and nose.  · People who have a weakened immune system  (immunocompromised).  · People who smoke.  · People who travel to areas where pneumonia-causing germs commonly exist.  · People who are around animal habitats or animals that have pneumonia-causing germs, including birds, bats, rabbits, cats, and farm animals.  What are the signs or symptoms?  Symptoms of this condition include:  · A dry cough.  · A wet (productive) cough.  · Fever.  · Sweating.  · Chest pain, especially when breathing deeply or coughing.  · Rapid breathing or difficulty breathing.  · Shortness of breath.  · Shaking chills.  · Fatigue.  · Muscle aches.  How is this diagnosed?  Your health care provider will take a medical history and perform a physical exam. You may also have other tests, including:  · Imaging studies of your chest, including X-rays.  · Tests to check your blood oxygen level and other blood gases.  · Other tests on blood, mucus (sputum), fluid around your lungs (pleural fluid), and urine.  If your pneumonia is severe, other tests may be done to identify the specific cause of your illness.  How is this treated?  The type of treatment that you receive depends on many factors, such as the cause of your pneumonia, the medicines you take, and other medical conditions that you have. For most adults, treatment and recovery from pneumonia may occur at home. In some cases, treatment must happen in a hospital. Treatment may include:  · Antibiotic medicines, if the pneumonia was caused by bacteria.  · Antiviral medicines, if the pneumonia was caused by a virus.  · Medicines that are given by mouth or through an IV tube.  · Oxygen.  · Respiratory therapy.  Although rare, treating severe pneumonia may include:  · Mechanical ventilation. This is done if you are not breathing well on your own and you cannot maintain a safe blood oxygen level.  · Thoracentesis. This procedure removes fluid around one lung or both lungs to help you breathe better.  Follow these instructions at home:  · Take  over-the-counter and prescription medicines only as told by your health care provider.  ¨ Only take cough medicine if you are losing sleep. Understand that cough medicine can prevent your body’s natural ability to remove mucus from your lungs.  ¨ If you were prescribed an antibiotic medicine, take it as told by your health care provider. Do not stop taking the antibiotic even if you start to feel better.  · Sleep in a semi-upright position at night. Try sleeping in a reclining chair, or place a few pillows under your head.  · Do not use tobacco products, including cigarettes, chewing tobacco, and e-cigarettes. If you need help quitting, ask your health care provider.  · Drink enough water to keep your urine clear or pale yellow. This will help to thin out mucus secretions in your lungs.  How is this prevented?  There are ways that you can decrease your risk of developing community-acquired pneumonia. Consider getting a pneumococcal vaccine if:  · You are older than 65 years of age.  · You are older than 19 years of age and are undergoing cancer treatment, have chronic lung disease, or have other medical conditions that affect your immune system. Ask your health care provider if this applies to you.  There are different types and schedules of pneumococcal vaccines. Ask your health care provider which vaccination option is best for you.  You may also prevent community-acquired pneumonia if you take these actions:  · Get an influenza vaccine every year. Ask your health care provider which type of influenza vaccine is best for you.  · Go to the dentist on a regular basis.  · Wash your hands often. Use hand  if soap and water are not available.  Contact a health care provider if:  · You have a fever.  · You are losing sleep because you cannot control your cough with cough medicine.  Get help right away if:  · You have worsening shortness of breath.  · You have increased chest pain.  · Your sickness becomes  worse, especially if you are an older adult or have a weakened immune system.  · You cough up blood.  This information is not intended to replace advice given to you by your health care provider. Make sure you discuss any questions you have with your health care provider.  Document Released: 12/18/2006 Document Revised: 04/27/2017 Document Reviewed: 04/13/2016  [employer] Patient Education © 2017 [employer]    Alcoholic Hepatitis  Alcoholic hepatitis is liver inflammation caused by drinking alcohol. This inflammation decreases the liver's ability to function normally.   CAUSES   Alcoholic hepatitis is caused by heavy drinking.   RISK FACTORS  You may have an increased risk of alcoholic hepatitis if:   · You drink large amounts of alcohol.  · You have been drinking heavily for years.  · You binge drink.  · You are female.  · You are obese.  · You have had infectious hepatitis.  · You are malnourished.  · You have close family members who have had alcoholic hepatitis.  SIGNS AND SYMPTOMS  · Abdominal pain.  · A swollen abdomen.  · Loss of appetite.  · Unintentional weight loss.  · Nausea and vomiting.  · Tiredness.  · Dry mouth.  · Severe thirst.  · A yellow tone to the skin and whites of the eyes (jaundice).  · Spidery veins, especially across the skin of the abdomen.  · Unusual bleeding.  · Itching.  · Trouble thinking clearly.  · Memory problems.  · Mood changes.  · Confusion.  · Numbness and tingling in the feet and legs.  DIAGNOSIS   Alcoholic hepatitis is diagnosed with blood tests that show problems with liver function. Additional tests may also be done, such as:  · An ultrasound.  · A CT scan.  · An MRI scan.  · A liver biopsy. For this test, a small sample of the liver will be taken and examined for evidence of liver damage.  TREATMENT  The most important step you can take to treat alcoholic hepatitis is to stop drinking alcohol. If you are addicted to alcohol, your health care provider will help  you create a plan to quit. It may involve:  · Taking medicine to decrease withdrawal symptoms.  · Entering a program to help you stop drinking.  · Joining a support group.  Additional treatment for alcoholic hepatitis may include:  · Medicines such as steroids. The medicines will help decrease the inflammation.  · Diet. Your health care provider might ask you to undergo nutritional counseling and follow a diet. You may also need to take dietary supplements and vitamins.  · A liver transplant. This procedure is performed in very severe cases. It is only performed on people who have totally stopped drinking and can commit to never drinking alcohol again.  HOME CARE INSTRUCTIONS  · Do not drink alcohol.  · Do not use medicines or eat foods that contain alcohol.  · Take medicines only as directed by your health care provider.  · Follow dietary instructions carefully.  · Keep all follow-up visits as directed by your health care provider. This is important.  SEEK MEDICAL CARE IF:  · You have a fever.  · You do not have your usual appetite.  · You have flu-like symptoms such as fatigue, weakness, or muscle aches.  · You feel nauseous or vomit.  · You bruise easily.  · Your urine is very dark.  · You have new abdominal pain.  SEEK IMMEDIATE MEDICAL CARE IF:  · There is blood in your vomit.  · You develop jaundice.  · Your skin itches severely.  · Your legs swell.  · Your stomach appears bloated.  · You have black, tarry-appearing stools.  · You bleed easily.  · You are confused or not thinking clearly.  · You have a seizure.  MAKE SURE YOU:  · Understand these instructions.  · Will watch your condition.  · Will get help right away if you are not doing well or get worse.  This information is not intended to replace advice given to you by your health care provider. Make sure you discuss any questions you have with your health care provider.  Document Released: 07/15/2015 Document Reviewed: 07/15/2015  [employer]  Patient Education © 2017 [employer]      Depression / Suicide Risk    As you are discharged from this [facility] facility, it is important to learn how to keep safe from harming yourself.    Recognize the warning signs:  · Abrupt changes in personality, positive or negative- including increase in energy   · Giving away possessions  · Change in eating patterns- significant weight changes-  positive or negative  · Change in sleeping patterns- unable to sleep or sleeping all the time   · Unwillingness or inability to communicate  · Depression  · Unusual sadness, discouragement and loneliness  · Talk of wanting to die  · Neglect of personal appearance   · Rebelliousness- reckless behavior  · Withdrawal from people/activities they love  · Confusion- inability to concentrate     If you or a loved one observes any of these behaviors or has concerns about self-harm, here's what you can do:  · Talk about it- your feelings and reasons for harming yourself  · Remove any means that you might use to hurt yourself (examples: pills, rope, extension cords, firearm)  · Get professional help from the community (Mental Health, Substance Abuse, psychological counseling)  · Do not be alone:Call your Safe Contact- someone whom you trust who will be there for you.  · Call your local CRISIS HOTLINE 466-7079 or 294-631-1249  · Call your local Children's Mobile Crisis Response Team [place] (907) 927-2077 or www.[employer]  · Call the toll free National Suicide Prevention Hotlines   · National Suicide Prevention Lifeline 940-654-UIMT (3247)  · National Hope Line Network 800-SUICIDE (978-8747)

## 2019-11-16 NOTE — PROGRESS NOTES
Pt. rec'd in bed, awake.  Denies pain.  V/S stable.  Safety reinforced and call bell placed in reach.  Will continue to monitor pt.

## 2019-11-16 NOTE — CARE PLAN
Problem: Safety  Goal: Will remain free from falls  Outcome: PROGRESSING AS EXPECTED  Note:   Pt is AAOx1 and does not use call bell appropriately.  Bed in lowest position, wheels locked, bed alarm is on and call bell in reach.     Problem: Pain Management  Goal: Pain level will decrease to patient's comfort goal  Outcome: PROGRESSING AS EXPECTED  Note:   Pt denied pain thru shift.

## 2019-11-16 NOTE — PROGRESS NOTES
[facility] Medicine Daily Progress Note    Date of Service  11/15/2019    Chief Complaint  44 y.o. male admitted 11/13/2019 with cough, shortness of breath    Hospital Course    Patient admitted with community-acquired pneumonia in the setting of presentation of cough and shortness of breath      Interval Problem Update  Patient seen and evaluated on rounds  Discussed with nursing staff on rounds  Cough improved  Continue current Abx  LFTs better - counseled regarding ETOH cessation   Anticipate discharge tomorrow     Consultants/Specialty  None    Code Status  FULL CODE     Disposition  Anticipate home once medically cleared - likely tomorrow     Review of Systems  Review of Systems   Constitutional: Negative for chills, diaphoresis, fever, malaise/fatigue and weight loss.   HENT: Negative for hearing loss and tinnitus.    Eyes: Negative for blurred vision and double vision.   Respiratory: Positive for cough, sputum production and shortness of breath. Negative for hemoptysis and wheezing.    Cardiovascular: Negative for chest pain, palpitations and PND.   Gastrointestinal: Negative for abdominal pain, blood in stool, constipation, diarrhea, heartburn, melena, nausea and vomiting.   Genitourinary: Negative for dysuria, flank pain, frequency, hematuria and urgency.   Musculoskeletal: Negative for back pain, falls, joint pain, myalgias and neck pain.   Skin: Negative for itching and rash.   Neurological: Negative for dizziness and headaches.   Psychiatric/Behavioral: Negative for depression and suicidal ideas.        Physical Exam  Temp:  [36.5 °C (97.7 °F)-37.2 °C (98.9 °F)] 36.6 °C (97.9 °F)  Pulse:  [64-91] 64  Resp:  [17-19] 17  BP: (108-134)/(69-83) 117/77  SpO2:  [95 %-98 %] 96 %    Physical Exam  HENT:      Head: Normocephalic.      Right Ear: External ear normal.      Left Ear: External ear normal.      Nose: Nose normal.      Mouth/Throat:      Mouth: Mucous membranes are moist.   Eyes:      General: No scleral  icterus.     Conjunctiva/sclera: Conjunctivae normal.      Pupils: Pupils are equal, round, and reactive to light.   Neck:      Musculoskeletal: Normal range of motion and neck supple.   Cardiovascular:      Rate and Rhythm: Normal rate and regular rhythm.      Pulses: Normal pulses.      Heart sounds: Normal heart sounds. No murmur. No friction rub. No gallop.    Pulmonary:      Effort: Pulmonary effort is normal. No respiratory distress.      Breath sounds: No stridor. Rhonchi and rales present. No wheezing.   Abdominal:      General: Abdomen is flat. Bowel sounds are normal. There is no distension.      Palpations: There is no mass.      Tenderness: There is no tenderness. There is no right CVA tenderness, left CVA tenderness, guarding or rebound.      Hernia: No hernia is present.   Musculoskeletal: Normal range of motion.   Skin:     General: Skin is warm and dry.      Capillary Refill: Capillary refill takes less than 2 seconds.      Coloration: Skin is not jaundiced.      Findings: No erythema.   Neurological:      General: No focal deficit present.      Mental Status: He is alert and oriented to person, place, and time. Mental status is at baseline.   Psychiatric:         Mood and Affect: Mood normal.         Behavior: Behavior normal.         Thought Content: Thought content normal.         Judgment: Judgment normal.         Fluids  No intake or output data in the 24 hours ending 11/15/19 1808    Laboratory  Recent Labs     11/13/19  1640 11/14/19 0714 11/15/19  0356   WBC 4.4* 5.8 5.8   RBC 4.09* 3.80* 3.92*   HEMOGLOBIN 12.7* 11.5* 11.9*   HEMATOCRIT 35.3* 33.0* 34.0*   MCV 86.3 86.8 86.7   MCH 31.1 30.3 30.4   MCHC 36.0* 34.8 35.0   RDW 46.2 47.7 48.2   PLATELETCT 78* 80* 80*   MPV 12.8 12.8 11.5     Recent Labs     11/13/19  1640 11/14/19  0714 11/15/19  0356   SODIUM 135 130* 133*   POTASSIUM 3.1* 3.1* 3.2*   CHLORIDE 102 101 106   CO2 24 24 22   GLUCOSE 117* 100* 91   BUN 5* 5* 4*   CREATININE 0.46*  0.47* 0.39*   CALCIUM 7.5* 7.6* 7.4*     Recent Labs     11/15/19  0356   INR 0.98               Imaging  DX-CHEST-LIMITED (1 VIEW)   Final Result         Minimal bibasilar opacities, atelectasis or chronic interstitial change.      CT-CTA CHEST PULMONARY ARTERY W/ RECONS   Final Result      1.  No pulmonary embolus.   2.  Evolving multifocal pneumonia.   3.  Emphysema.   4.  Mild mediastinal lymphadenopathy, which is likely reactive and related to pneumonia. Consider follow-up chest CT in 2-3 months upon pneumonia resolution.   5.  Hepatic steatosis.   6.  Splenomegaly.            OUTSIDE IMAGES-DX CHEST   Final Result      OUTSIDE IMAGES-CT CHEST   Final Result           Assessment/Plan  Community acquired pneumonia- (present on admission)  Assessment & Plan  Patient has underlying multifocal community-acquired pneumonia  Continue intravenous Unasyn and azithromycin  De-escalate to oral antibiotic therapy as clinically appropriate  Findings of mediastinal lymphadenopathy, patient will require interval chest x-ray in 2 weeks with PCP and 4 weeks CT chest with PCP following discharge  Continue close clinical monitoring    Alcohol dependence (HCC)- (present on admission)  Assessment & Plan  Last drink, last Sunday  No signs of acute alcohol withdrawal at this time  On CIWA protocol, supplementation of benzodiazepines as clinically appropriate per CIWA protocol  Of note, patient not forthcoming regarding this  Counseled and educated regarding cessation respectively    Alcoholic hepatitis without ascites- (present on admission)  Assessment & Plan  Monitor LFTs and INR  Interval CMP, INR in the morning tomorrow  Counseled and educated regarding alcohol cessation  Optimize nutrition  Improving     Hypokalemia- (present on admission)  Assessment & Plan  Replaced, monitor    Nicotine abuse- (present on admission)  Assessment & Plan  Patient counseled and educated regarding smoking cessation    Pancytopenia (HCC)- (present  on admission)  Assessment & Plan  Suspect related to chronic alcoholism  Alcohol cessation reinforced  Recommend outpatient PCP follow-up, monitoring and evaluation by PCP if persistent, including referral to hematology       VTE prophylaxis: SC Lovenox

## 2019-11-17 VITALS
BODY MASS INDEX: 22.99 KG/M2 | TEMPERATURE: 97.8 F | OXYGEN SATURATION: 97 % | SYSTOLIC BLOOD PRESSURE: 121 MMHG | HEIGHT: 68 IN | HEART RATE: 80 BPM | DIASTOLIC BLOOD PRESSURE: 75 MMHG | WEIGHT: 151.68 LBS | RESPIRATION RATE: 16 BRPM

## 2019-11-17 LAB
ALBUMIN SERPL BCP-MCNC: 2.6 G/DL (ref 3.2–4.9)
ALBUMIN/GLOB SERPL: 0.6 G/DL
ALP SERPL-CCNC: 289 U/L (ref 30–99)
ALT SERPL-CCNC: 102 U/L (ref 2–50)
ANION GAP SERPL CALC-SCNC: 7 MMOL/L (ref 0–11.9)
AST SERPL-CCNC: 124 U/L (ref 12–45)
BILIRUB SERPL-MCNC: 1.1 MG/DL (ref 0.1–1.5)
BUN SERPL-MCNC: 7 MG/DL (ref 8–22)
CALCIUM SERPL-MCNC: 8.3 MG/DL (ref 8.5–10.5)
CHLORIDE SERPL-SCNC: 105 MMOL/L (ref 96–112)
CO2 SERPL-SCNC: 21 MMOL/L (ref 20–33)
CREAT SERPL-MCNC: 0.51 MG/DL (ref 0.5–1.4)
ERYTHROCYTE [DISTWIDTH] IN BLOOD BY AUTOMATED COUNT: 53.1 FL (ref 35.9–50)
GLOBULIN SER CALC-MCNC: 4.4 G/DL (ref 1.9–3.5)
GLUCOSE SERPL-MCNC: 91 MG/DL (ref 65–99)
HCT VFR BLD AUTO: 38.4 % (ref 42–52)
HGB BLD-MCNC: 13 G/DL (ref 14–18)
MAGNESIUM SERPL-MCNC: 2.1 MG/DL (ref 1.5–2.5)
MCH RBC QN AUTO: 30.4 PG (ref 27–33)
MCHC RBC AUTO-ENTMCNC: 33.9 G/DL (ref 33.7–35.3)
MCV RBC AUTO: 89.9 FL (ref 81.4–97.8)
PHOSPHATE SERPL-MCNC: 3.9 MG/DL (ref 2.5–4.5)
PLATELET # BLD AUTO: 101 K/UL (ref 164–446)
PMV BLD AUTO: 10.5 FL (ref 9–12.9)
POTASSIUM SERPL-SCNC: 4.4 MMOL/L (ref 3.6–5.5)
PROT SERPL-MCNC: 7 G/DL (ref 6–8.2)
RBC # BLD AUTO: 4.27 M/UL (ref 4.7–6.1)
SODIUM SERPL-SCNC: 133 MMOL/L (ref 135–145)
WBC # BLD AUTO: 8.2 K/UL (ref 4.8–10.8)

## 2019-11-17 PROCEDURE — 99232 SBSQ HOSP IP/OBS MODERATE 35: CPT | Performed by: INTERNAL MEDICINE

## 2019-11-17 PROCEDURE — 700111 HCHG RX REV CODE 636 W/ 250 OVERRIDE (IP): Performed by: INTERNAL MEDICINE

## 2019-11-17 PROCEDURE — 700102 HCHG RX REV CODE 250 W/ 637 OVERRIDE(OP): Performed by: INTERNAL MEDICINE

## 2019-11-17 PROCEDURE — A9270 NON-COVERED ITEM OR SERVICE: HCPCS | Performed by: INTERNAL MEDICINE

## 2019-11-17 PROCEDURE — 85027 COMPLETE CBC AUTOMATED: CPT

## 2019-11-17 PROCEDURE — 84100 ASSAY OF PHOSPHORUS: CPT

## 2019-11-17 PROCEDURE — 83735 ASSAY OF MAGNESIUM: CPT

## 2019-11-17 PROCEDURE — 36415 COLL VENOUS BLD VENIPUNCTURE: CPT

## 2019-11-17 PROCEDURE — 80053 COMPREHEN METABOLIC PANEL: CPT

## 2019-11-17 RX ADMIN — Medication 100 MG: at 06:01

## 2019-11-17 RX ADMIN — ASPIRIN 81 MG 81 MG: 81 TABLET ORAL at 06:02

## 2019-11-17 RX ADMIN — GABAPENTIN 300 MG: 300 CAPSULE ORAL at 06:01

## 2019-11-17 RX ADMIN — ENOXAPARIN SODIUM 40 MG: 100 INJECTION SUBCUTANEOUS at 06:02

## 2019-11-17 RX ADMIN — NICOTINE POLACRILEX 2 MG: 2 GUM, CHEWING BUCCAL at 10:26

## 2019-11-17 RX ADMIN — VALPROIC ACID 250 MG: 250 CAPSULE, LIQUID FILLED ORAL at 06:01

## 2019-11-17 RX ADMIN — THERA TABS 1 TABLET: TAB at 06:01

## 2019-11-17 RX ADMIN — FOLIC ACID 1 MG: 1 TABLET ORAL at 06:02

## 2019-11-17 RX ADMIN — AMOXICILLIN AND CLAVULANATE POTASSIUM 1 TABLET: 875; 125 TABLET, FILM COATED ORAL at 06:01

## 2019-11-17 RX ADMIN — NICOTINE 14 MG: 14 PATCH TRANSDERMAL at 06:05

## 2019-11-17 RX ADMIN — LORAZEPAM 1 MG: 1 TABLET ORAL at 10:26

## 2019-11-17 ASSESSMENT — LIFESTYLE VARIABLES
VISUAL DISTURBANCES: NOT PRESENT
PAROXYSMAL SWEATS: BARELY PERCEPTIBLE SWEATING. PALMS MOIST
PAROXYSMAL SWEATS: BARELY PERCEPTIBLE SWEATING. PALMS MOIST
AUDITORY DISTURBANCES: NOT PRESENT
HEADACHE, FULLNESS IN HEAD: NOT PRESENT
NAUSEA AND VOMITING: NO NAUSEA AND NO VOMITING
ORIENTATION AND CLOUDING OF SENSORIUM: ORIENTED AND CAN DO SERIAL ADDITIONS
TREMOR: *
AGITATION: NORMAL ACTIVITY
NAUSEA AND VOMITING: NO NAUSEA AND NO VOMITING
VISUAL DISTURBANCES: VERY MILD SENSITIVITY
TREMOR: *
ORIENTATION AND CLOUDING OF SENSORIUM: ORIENTED AND CAN DO SERIAL ADDITIONS
HEADACHE, FULLNESS IN HEAD: NOT PRESENT
TOTAL SCORE: 5
AGITATION: NORMAL ACTIVITY
AUDITORY DISTURBANCES: VERY MILD HARSHNESS OR ABILITY TO FRIGHTEN
ANXIETY: NO ANXIETY (AT EASE)
AUDITORY DISTURBANCES: NOT PRESENT
TOTAL SCORE: 4
NAUSEA AND VOMITING: NO NAUSEA AND NO VOMITING
PAROXYSMAL SWEATS: BARELY PERCEPTIBLE SWEATING. PALMS MOIST
ANXIETY: MILDLY ANXIOUS
HEADACHE, FULLNESS IN HEAD: NOT PRESENT
ORIENTATION AND CLOUDING OF SENSORIUM: ORIENTED AND CAN DO SERIAL ADDITIONS
AGITATION: NORMAL ACTIVITY
TREMOR: TREMOR NOT VISIBLE BUT CAN BE FELT, FINGERTIP TO FINGERTIP
VISUAL DISTURBANCES: NOT PRESENT
TOTAL SCORE: 3
ANXIETY: MILDLY ANXIOUS

## 2019-11-17 ASSESSMENT — ENCOUNTER SYMPTOMS
CHILLS: 0
DIARRHEA: 0
HEMOPTYSIS: 0
HEARTBURN: 0
NAUSEA: 0
NECK PAIN: 0
FEVER: 0
SPUTUM PRODUCTION: 1
CONSTIPATION: 0
FALLS: 0
DEPRESSION: 0
BLOOD IN STOOL: 0
HEADACHES: 0
DIZZINESS: 0
BACK PAIN: 0
PALPITATIONS: 0
WEIGHT LOSS: 0
BLURRED VISION: 0
COUGH: 1
DOUBLE VISION: 0
WHEEZING: 0
MYALGIAS: 0
FLANK PAIN: 0
ABDOMINAL PAIN: 0
VOMITING: 0
PND: 0
DIAPHORESIS: 0
SHORTNESS OF BREATH: 1

## 2019-11-17 NOTE — PROGRESS NOTES
[facility] Medicine Daily Progress Note    Date of Service  11/17/2019    Chief Complaint  44 y.o. male admitted 11/13/2019 with cough, shortness of breath    Hospital Course    Patient admitted with community-acquired pneumonia in the setting of presentation of cough and shortness of breath      Interval Problem Update  Patient seen and evaluated on rounds  Discussed with nursing staff on rounds  Improved ETOH withdrawal   No issues otherwise  Still intermittent high ciwa   Advised ongoing monitoring    Benefits of ongoing hospital stay / risk of leaving AMA discussed with patient     Consultants/Specialty  None    Code Status  FULL CODE     Disposition  Home once ETOH withdrawal is better     Review of Systems  Review of Systems   Constitutional: Negative for chills, diaphoresis, fever, malaise/fatigue and weight loss.   HENT: Negative for hearing loss and tinnitus.    Eyes: Negative for blurred vision and double vision.   Respiratory: Positive for cough, sputum production and shortness of breath. Negative for hemoptysis and wheezing.    Cardiovascular: Negative for chest pain, palpitations and PND.   Gastrointestinal: Negative for abdominal pain, blood in stool, constipation, diarrhea, heartburn, melena, nausea and vomiting.   Genitourinary: Negative for dysuria, flank pain, frequency, hematuria and urgency.   Musculoskeletal: Negative for back pain, falls, joint pain, myalgias and neck pain.   Skin: Negative for itching and rash.   Neurological: Negative for dizziness and headaches.   Psychiatric/Behavioral: Negative for depression and suicidal ideas.        Physical Exam  Temp:  [36.2 °C (97.1 °F)-36.6 °C (97.8 °F)] 36.6 °C (97.8 °F)  Pulse:  [] 80  Resp:  [16-18] 16  BP: (121-131)/(75-88) 121/75  SpO2:  [93 %-98 %] 97 %    Physical Exam  HENT:      Head: Normocephalic.      Right Ear: External ear normal.      Left Ear: External ear normal.      Nose: Nose normal.      Mouth/Throat:      Mouth: Mucous  membranes are moist.   Eyes:      General: No scleral icterus.     Conjunctiva/sclera: Conjunctivae normal.      Pupils: Pupils are equal, round, and reactive to light.   Neck:      Musculoskeletal: Normal range of motion and neck supple.   Cardiovascular:      Rate and Rhythm: Normal rate and regular rhythm.      Pulses: Normal pulses.      Heart sounds: Normal heart sounds. No murmur. No friction rub. No gallop.    Pulmonary:      Effort: Pulmonary effort is normal. No respiratory distress.      Breath sounds: No stridor. Rhonchi and rales present. No wheezing.   Abdominal:      General: Abdomen is flat. Bowel sounds are normal. There is no distension.      Palpations: There is no mass.      Tenderness: There is no tenderness. There is no right CVA tenderness, left CVA tenderness, guarding or rebound.      Hernia: No hernia is present.   Musculoskeletal: Normal range of motion.   Skin:     General: Skin is warm and dry.      Capillary Refill: Capillary refill takes less than 2 seconds.      Coloration: Skin is not jaundiced.      Findings: No erythema.   Neurological:      General: No focal deficit present.      Mental Status: He is alert and oriented to person, place, and time. Mental status is at baseline.   Psychiatric:         Mood and Affect: Mood normal.         Behavior: Behavior normal.         Thought Content: Thought content normal.         Judgment: Judgment normal.         Fluids    Intake/Output Summary (Last 24 hours) at 11/17/2019 1506  Last data filed at 11/16/2019 2000  Gross per 24 hour   Intake 360 ml   Output 675 ml   Net -315 ml       Laboratory  Recent Labs     11/15/19  0356 11/16/19  0310 11/17/19  0308   WBC 5.8 6.9 8.2   RBC 3.92* 3.92* 4.27*   HEMOGLOBIN 11.9* 11.9* 13.0*   HEMATOCRIT 34.0* 34.6* 38.4*   MCV 86.7 88.3 89.9   MCH 30.4 30.4 30.4   MCHC 35.0 34.4 33.9   RDW 48.2 51.2* 53.1*   PLATELETCT 80* 85* 101*   MPV 11.5 10.7 10.5     Recent Labs     11/15/19  0356 11/16/19  0310  11/17/19  0308   SODIUM 133* 133* 133*   POTASSIUM 3.2* 3.6 4.4   CHLORIDE 106 106 105   CO2 22 22 21   GLUCOSE 91 96 91   BUN 4* 6* 7*   CREATININE 0.39* 0.42* 0.51   CALCIUM 7.4* 8.1* 8.3*     Recent Labs     11/15/19  0356   INR 0.98               Imaging  DX-CHEST-LIMITED (1 VIEW)   Final Result         Minimal bibasilar opacities, atelectasis or chronic interstitial change.      CT-CTA CHEST PULMONARY ARTERY W/ RECONS   Final Result      1.  No pulmonary embolus.   2.  Evolving multifocal pneumonia.   3.  Emphysema.   4.  Mild mediastinal lymphadenopathy, which is likely reactive and related to pneumonia. Consider follow-up chest CT in 2-3 months upon pneumonia resolution.   5.  Hepatic steatosis.   6.  Splenomegaly.            OUTSIDE IMAGES-DX CHEST   Final Result      OUTSIDE IMAGES-CT CHEST   Final Result           Assessment/Plan  Community acquired pneumonia- (present on admission)  Assessment & Plan  Patient has underlying multifocal community-acquired pneumonia  Completed azithromycin  De escalated to PO Augmentin - stop dates in place   Findings of mediastinal lymphadenopathy, patient will require interval chest x-ray in 2 weeks with PCP and 4 weeks CT chest with PCP following discharge  Continue close clinical monitoring    Alcohol dependence (HCC)- (present on admission)  Assessment & Plan  Last drink, last Sunday  In acute ETOH withdrawal at this time   On CIWA protocol, supplementation of benzodiazepines as clinically appropriate per CIWA protocol  Added gabapentin  VA - monitor LFTs - if worsening stop this   Counseled and educated regarding cessation respectively  Continue close clinical monitoring   If worsening transfer ICU for phenobarbital protocol     Alcoholic hepatitis without ascites- (present on admission)  Assessment & Plan  Monitor LFTs and INR  Interval CMP, INR in the morning tomorrow  Counseled and educated regarding alcohol cessation  Optimize nutrition  Improving     Hypokalemia-  (present on admission)  Assessment & Plan  Replaced, monitor    Nicotine abuse- (present on admission)  Assessment & Plan  Patient counseled and educated regarding smoking cessation    Pancytopenia (HCC)- (present on admission)  Assessment & Plan  Suspect related to chronic alcoholism  Alcohol cessation reinforced  Recommend outpatient PCP follow-up, monitoring and evaluation by PCP if persistent, including referral to hematology       VTE prophylaxis: SC Lovenox

## 2019-11-17 NOTE — PROGRESS NOTES
Valeriano Cantu patient has chosen to leave the hospital against medical advice. The attending physician has not discharged the patient. Patient is not a risk to himself or others. I have discussed with the patient the following:  Physician has not determined patient is ready for discharge.      Discharge against medical advice form has been Signed.      Patient is a   and a referral to  was made.    Attending physician has been notified.

## 2019-11-17 NOTE — PROGRESS NOTES
"Patient upset about not being discharged. \"I want to leave against medical advice\" \" I don't think I'm going through stay another day\". Ciwa of 5-6. Patient is orient x 4, able to walk without assistance. Will notify physician.   "

## 2019-11-17 NOTE — DISCHARGE SUMMARY
Discharge Summary    CHIEF COMPLAINT ON ADMISSION  Chief Complaint   Patient presents with   • Chest Pressure   • Palpitations   • Cough   • Shortness of Breath       Reason for Admission  EMS R-07 FR [facility]     Admission Date  11/13/2019    CODE STATUS  Full Code    HPI & HOSPITAL COURSE  This is a 44 y.o. male here with community acquired pneumonia.  He was managed with empiric antibiotic therapy, during his hospitalization he had significant improvement.  Prior to presentation, significant history of alcoholism, findings of alcoholic hepatitis on presentation and hospitalization complicated by development of acute alcohol withdrawal.  For this he was managed with CIWA protocol with supplementation of benzodiazepines per CIWA protocol, gabapentin and valproic acid was added.  On November 16, 2019, patient had CIWA scores as high as 20.  With addition of gabapentin/valproic acid CIWA scores improved, reevaluated on November 17, 2019, CIWA scores improved compared to November 16, 2019 was still ongoing risk of alcohol withdrawal.  With ongoing monitoring in the hospital setting to ensure he does not develop delirium tremens.  Discussed ongoing benefits of hospitalization, risk of discharge AGAINST MEDICAL ADVICE.    Patient verbalized understanding regarding above, wants to discharge AGAINST MEDICAL ADVICE at this time.  Has completed adequate therapy for community acquired pneumonia with 5 days of antibiotics.    Left AMA     Advised close outpatient follow-up with PCP, return to the emergency department for any worsening symptoms of alcohol withdrawal    Discharge Date  11/17/2019     FOLLOW UP ITEMS POST DISCHARGE  Left AMA     DISCHARGE DIAGNOSES  Active Problems:    Community acquired pneumonia POA: Yes    Alcoholic hepatitis without ascites POA: Yes    Alcohol dependence (HCC) POA: Yes    Pancytopenia (HCC) POA: Yes    Nicotine abuse POA: Yes    Hypokalemia POA: Yes  Resolved Problems:    * No resolved  hospital problems. *

## 2019-11-17 NOTE — PROGRESS NOTES
Received bedside report from RN, pt care assumed, VSS, pt assessment complete. Pt AAOx4, no c/o  pain at this time. No signs of acute distress noted at this time. POC discussed with pt and verbalizes no questions. Pt denies any additional needs at this time. Bed in lowest position, bed alarm on, pt educated on fall risk and verbalized understanding, call light within reach, hourly rounding initiated. In a sinus rhythm. CIWA of 5. Will observe closely.

## 2019-11-17 NOTE — PROGRESS NOTES
Pt rec'd in bed, awake.  Denies pain.  V/s stable.  Safety reinforced and call bell placed in reach.  Bed alarm is on.  Will continue to monitor pt.

## 2019-11-17 NOTE — CARE PLAN
Problem: Safety  Goal: Will remain free from falls  Outcome: PROGRESSING AS EXPECTED  Note:   Pt is AAOx4 and used call bell appropriately thru night.  Bed alarm is on.     Problem: Pain Management  Goal: Pain level will decrease to patient's comfort goal  Outcome: PROGRESSING AS EXPECTED  Note:   Pt denied pain thru shift.

## 2019-11-19 LAB
BACTERIA BLD CULT: NORMAL
BACTERIA BLD CULT: NORMAL
SIGNIFICANT IND 70042: NORMAL
SIGNIFICANT IND 70042: NORMAL
SITE SITE: NORMAL
SITE SITE: NORMAL
SOURCE SOURCE: NORMAL
SOURCE SOURCE: NORMAL

## 2023-05-25 ENCOUNTER — NON-PROVIDER VISIT (OUTPATIENT)
Dept: OCCUPATIONAL MEDICINE | Facility: CLINIC | Age: 48
End: 2023-05-25

## 2023-05-25 DIAGNOSIS — Z02.83 ENCOUNTER FOR DRUG SCREENING: ICD-10-CM

## 2023-05-25 PROCEDURE — 80305 DRUG TEST PRSMV DIR OPT OBS: CPT | Performed by: NURSE PRACTITIONER

## 2023-06-21 ENCOUNTER — OFFICE VISIT (OUTPATIENT)
Dept: OCCUPATIONAL MEDICINE | Facility: CLINIC | Age: 48
End: 2023-06-21

## 2023-06-21 DIAGNOSIS — Z02.4 ENCOUNTER FOR COMMERCIAL DRIVER MEDICAL EXAMINATION (CDME): ICD-10-CM

## 2023-06-21 PROCEDURE — 7100 PR DOT PHYSICAL: Performed by: PREVENTIVE MEDICINE

## 2024-06-14 ENCOUNTER — OFFICE VISIT (OUTPATIENT)
Dept: OCCUPATIONAL MEDICINE | Facility: CLINIC | Age: 49
End: 2024-06-14

## 2024-06-14 VITALS
WEIGHT: 157.2 LBS | SYSTOLIC BLOOD PRESSURE: 102 MMHG | HEIGHT: 68 IN | RESPIRATION RATE: 16 BRPM | DIASTOLIC BLOOD PRESSURE: 58 MMHG | TEMPERATURE: 97 F | HEART RATE: 116 BPM | BODY MASS INDEX: 23.82 KG/M2 | OXYGEN SATURATION: 96 %

## 2024-06-14 DIAGNOSIS — Z02.83 ENCOUNTER FOR DRUG SCREENING: ICD-10-CM

## 2024-06-14 DIAGNOSIS — Z02.4 ENCOUNTER FOR COMMERCIAL DRIVER MEDICAL EXAMINATION (CDME): ICD-10-CM

## 2024-06-14 LAB
APPEARANCE UR: CLEAR
BILIRUB UR STRIP-MCNC: NORMAL MG/DL
COLOR UR AUTO: NORMAL
GLUCOSE UR STRIP.AUTO-MCNC: NORMAL MG/DL
KETONES UR STRIP.AUTO-MCNC: NORMAL MG/DL
LEUKOCYTE ESTERASE UR QL STRIP.AUTO: NEGATIVE
NITRITE UR QL STRIP.AUTO: NEGATIVE
PH UR STRIP.AUTO: 5.5 [PH] (ref 5–8)
PROT UR QL STRIP: NORMAL MG/DL
RBC UR QL AUTO: NEGATIVE
SP GR UR STRIP.AUTO: 1.02
UROBILINOGEN UR STRIP-MCNC: NORMAL MG/DL

## 2024-06-14 PROCEDURE — 81002 URINALYSIS NONAUTO W/O SCOPE: CPT | Performed by: PREVENTIVE MEDICINE

## 2024-06-14 PROCEDURE — 7100 PR DOT PHYSICAL: Performed by: PREVENTIVE MEDICINE

## 2024-06-14 ASSESSMENT — VISUAL ACUITY
OD_CC: 20/25
OS_CC: 20/25

## 2024-06-14 ASSESSMENT — FIBROSIS 4 INDEX: FIB4 SCORE: 1.16
